# Patient Record
Sex: FEMALE
[De-identification: names, ages, dates, MRNs, and addresses within clinical notes are randomized per-mention and may not be internally consistent; named-entity substitution may affect disease eponyms.]

---

## 2022-11-07 PROBLEM — Z00.00 ENCOUNTER FOR PREVENTIVE HEALTH EXAMINATION: Status: ACTIVE | Noted: 2022-11-07

## 2022-11-19 ENCOUNTER — TRANSCRIPTION ENCOUNTER (OUTPATIENT)
Age: 37
End: 2022-11-19

## 2022-11-19 ENCOUNTER — INPATIENT (INPATIENT)
Facility: HOSPITAL | Age: 37
LOS: 0 days | Discharge: HOME | End: 2022-11-19
Attending: OBSTETRICS & GYNECOLOGY | Admitting: OBSTETRICS & GYNECOLOGY
Payer: COMMERCIAL

## 2022-11-19 VITALS — SYSTOLIC BLOOD PRESSURE: 137 MMHG | HEART RATE: 76 BPM | DIASTOLIC BLOOD PRESSURE: 82 MMHG

## 2022-11-19 VITALS — OXYGEN SATURATION: 99 % | HEART RATE: 133 BPM

## 2022-11-19 DIAGNOSIS — Z3A.00 WEEKS OF GESTATION OF PREGNANCY NOT SPECIFIED: ICD-10-CM

## 2022-11-19 DIAGNOSIS — Z98.890 OTHER SPECIFIED POSTPROCEDURAL STATES: Chronic | ICD-10-CM

## 2022-11-19 DIAGNOSIS — Z98.891 HISTORY OF UTERINE SCAR FROM PREVIOUS SURGERY: Chronic | ICD-10-CM

## 2022-11-19 DIAGNOSIS — Z34.80 ENCOUNTER FOR SUPERVISION OF OTHER NORMAL PREGNANCY, UNSPECIFIED TRIMESTER: ICD-10-CM

## 2022-11-19 DIAGNOSIS — O26.899 OTHER SPECIFIED PREGNANCY RELATED CONDITIONS, UNSPECIFIED TRIMESTER: ICD-10-CM

## 2022-11-19 LAB
ALBUMIN SERPL ELPH-MCNC: 3.6 G/DL — SIGNIFICANT CHANGE UP (ref 3.3–5)
ALP SERPL-CCNC: 79 U/L — SIGNIFICANT CHANGE UP (ref 40–120)
ALT FLD-CCNC: 15 U/L — SIGNIFICANT CHANGE UP (ref 10–45)
ANION GAP SERPL CALC-SCNC: 12 MMOL/L — SIGNIFICANT CHANGE UP (ref 5–17)
AST SERPL-CCNC: 14 U/L — SIGNIFICANT CHANGE UP (ref 10–40)
BILIRUB SERPL-MCNC: 0.2 MG/DL — SIGNIFICANT CHANGE UP (ref 0.2–1.2)
BLD GP AB SCN SERPL QL: NEGATIVE — SIGNIFICANT CHANGE UP
BUN SERPL-MCNC: 6 MG/DL — LOW (ref 7–23)
CALCIUM SERPL-MCNC: 9.4 MG/DL — SIGNIFICANT CHANGE UP (ref 8.4–10.5)
CHLORIDE SERPL-SCNC: 101 MMOL/L — SIGNIFICANT CHANGE UP (ref 96–108)
CO2 SERPL-SCNC: 22 MMOL/L — SIGNIFICANT CHANGE UP (ref 22–31)
CREAT SERPL-MCNC: 0.47 MG/DL — LOW (ref 0.5–1.3)
EGFR: 126 ML/MIN/1.73M2 — SIGNIFICANT CHANGE UP
GLUCOSE SERPL-MCNC: 109 MG/DL — HIGH (ref 70–99)
HCT VFR BLD CALC: 38.2 % — SIGNIFICANT CHANGE UP (ref 34.5–45)
HGB BLD-MCNC: 12.5 G/DL — SIGNIFICANT CHANGE UP (ref 11.5–15.5)
MCHC RBC-ENTMCNC: 28.4 PG — SIGNIFICANT CHANGE UP (ref 27–34)
MCHC RBC-ENTMCNC: 32.7 GM/DL — SIGNIFICANT CHANGE UP (ref 32–36)
MCV RBC AUTO: 86.8 FL — SIGNIFICANT CHANGE UP (ref 80–100)
NRBC # BLD: 0 /100 WBCS — SIGNIFICANT CHANGE UP (ref 0–0)
PLATELET # BLD AUTO: 295 K/UL — SIGNIFICANT CHANGE UP (ref 150–400)
POTASSIUM SERPL-MCNC: 3.5 MMOL/L — SIGNIFICANT CHANGE UP (ref 3.5–5.3)
POTASSIUM SERPL-SCNC: 3.5 MMOL/L — SIGNIFICANT CHANGE UP (ref 3.5–5.3)
PROT SERPL-MCNC: 6.7 G/DL — SIGNIFICANT CHANGE UP (ref 6–8.3)
RBC # BLD: 4.4 M/UL — SIGNIFICANT CHANGE UP (ref 3.8–5.2)
RBC # FLD: 14.3 % — SIGNIFICANT CHANGE UP (ref 10.3–14.5)
RH IG SCN BLD-IMP: POSITIVE — SIGNIFICANT CHANGE UP
RH IG SCN BLD-IMP: POSITIVE — SIGNIFICANT CHANGE UP
SARS-COV-2 RNA SPEC QL NAA+PROBE: SIGNIFICANT CHANGE UP
SODIUM SERPL-SCNC: 135 MMOL/L — SIGNIFICANT CHANGE UP (ref 135–145)
WBC # BLD: 18.43 K/UL — HIGH (ref 3.8–10.5)
WBC # FLD AUTO: 18.43 K/UL — HIGH (ref 3.8–10.5)

## 2022-11-19 PROCEDURE — 86900 BLOOD TYPING SEROLOGIC ABO: CPT

## 2022-11-19 PROCEDURE — 87075 CULTR BACTERIA EXCEPT BLOOD: CPT

## 2022-11-19 PROCEDURE — G0463: CPT

## 2022-11-19 PROCEDURE — 85027 COMPLETE CBC AUTOMATED: CPT

## 2022-11-19 PROCEDURE — 86850 RBC ANTIBODY SCREEN: CPT

## 2022-11-19 PROCEDURE — 88305 TISSUE EXAM BY PATHOLOGIST: CPT

## 2022-11-19 PROCEDURE — 59409 OBSTETRICAL CARE: CPT | Mod: U7

## 2022-11-19 PROCEDURE — 88305 TISSUE EXAM BY PATHOLOGIST: CPT | Mod: 26

## 2022-11-19 PROCEDURE — 87186 SC STD MICRODIL/AGAR DIL: CPT

## 2022-11-19 PROCEDURE — 87077 CULTURE AEROBIC IDENTIFY: CPT

## 2022-11-19 PROCEDURE — 86901 BLOOD TYPING SEROLOGIC RH(D): CPT

## 2022-11-19 PROCEDURE — 80053 COMPREHEN METABOLIC PANEL: CPT

## 2022-11-19 PROCEDURE — 87635 SARS-COV-2 COVID-19 AMP PRB: CPT

## 2022-11-19 PROCEDURE — 36415 COLL VENOUS BLD VENIPUNCTURE: CPT

## 2022-11-19 PROCEDURE — 87070 CULTURE OTHR SPECIMN AEROBIC: CPT

## 2022-11-19 RX ORDER — DIBUCAINE 1 %
1 OINTMENT (GRAM) RECTAL EVERY 6 HOURS
Refills: 0 | Status: DISCONTINUED | OUTPATIENT
Start: 2022-11-19 | End: 2022-11-19

## 2022-11-19 RX ORDER — KETOROLAC TROMETHAMINE 30 MG/ML
30 SYRINGE (ML) INJECTION ONCE
Refills: 0 | Status: DISCONTINUED | OUTPATIENT
Start: 2022-11-19 | End: 2022-11-19

## 2022-11-19 RX ORDER — OXYCODONE HYDROCHLORIDE 5 MG/1
5 TABLET ORAL ONCE
Refills: 0 | Status: DISCONTINUED | OUTPATIENT
Start: 2022-11-19 | End: 2022-11-19

## 2022-11-19 RX ORDER — SODIUM CHLORIDE 9 MG/ML
1000 INJECTION, SOLUTION INTRAVENOUS
Refills: 0 | Status: DISCONTINUED | OUTPATIENT
Start: 2022-11-19 | End: 2022-11-19

## 2022-11-19 RX ORDER — HYDROMORPHONE HYDROCHLORIDE 2 MG/ML
0.5 INJECTION INTRAMUSCULAR; INTRAVENOUS; SUBCUTANEOUS ONCE
Refills: 0 | Status: DISCONTINUED | OUTPATIENT
Start: 2022-11-19 | End: 2022-11-19

## 2022-11-19 RX ORDER — MAGNESIUM HYDROXIDE 400 MG/1
30 TABLET, CHEWABLE ORAL
Refills: 0 | Status: DISCONTINUED | OUTPATIENT
Start: 2022-11-19 | End: 2022-11-19

## 2022-11-19 RX ORDER — CARBOPROST TROMETHAMINE 250 UG/ML
250 INJECTION, SOLUTION INTRAMUSCULAR
Refills: 0 | Status: DISCONTINUED | OUTPATIENT
Start: 2022-11-19 | End: 2022-11-19

## 2022-11-19 RX ORDER — ACETAMINOPHEN 500 MG
1000 TABLET ORAL ONCE
Refills: 0 | Status: COMPLETED | OUTPATIENT
Start: 2022-11-19 | End: 2022-11-19

## 2022-11-19 RX ORDER — LANOLIN
1 OINTMENT (GRAM) TOPICAL EVERY 6 HOURS
Refills: 0 | Status: DISCONTINUED | OUTPATIENT
Start: 2022-11-19 | End: 2022-11-19

## 2022-11-19 RX ORDER — IBUPROFEN 200 MG
600 TABLET ORAL EVERY 6 HOURS
Refills: 0 | Status: COMPLETED | OUTPATIENT
Start: 2022-11-19 | End: 2023-10-18

## 2022-11-19 RX ORDER — ALPRAZOLAM 0.25 MG
0.5 TABLET ORAL ONCE
Refills: 0 | Status: DISCONTINUED | OUTPATIENT
Start: 2022-11-19 | End: 2022-11-19

## 2022-11-19 RX ORDER — INFLUENZA VIRUS VACCINE 15; 15; 15; 15 UG/.5ML; UG/.5ML; UG/.5ML; UG/.5ML
0.5 SUSPENSION INTRAMUSCULAR ONCE
Refills: 0 | Status: DISCONTINUED | OUTPATIENT
Start: 2022-11-19 | End: 2022-11-19

## 2022-11-19 RX ORDER — IBUPROFEN 200 MG
600 TABLET ORAL EVERY 6 HOURS
Refills: 0 | Status: DISCONTINUED | OUTPATIENT
Start: 2022-11-19 | End: 2022-11-19

## 2022-11-19 RX ORDER — DIPHENOXYLATE HCL/ATROPINE 2.5-.025MG
2 TABLET ORAL ONCE
Refills: 0 | Status: DISCONTINUED | OUTPATIENT
Start: 2022-11-19 | End: 2022-11-19

## 2022-11-19 RX ORDER — PRAMOXINE HYDROCHLORIDE 150 MG/15G
1 AEROSOL, FOAM RECTAL EVERY 4 HOURS
Refills: 0 | Status: DISCONTINUED | OUTPATIENT
Start: 2022-11-19 | End: 2022-11-19

## 2022-11-19 RX ORDER — SODIUM CHLORIDE 9 MG/ML
3 INJECTION INTRAMUSCULAR; INTRAVENOUS; SUBCUTANEOUS EVERY 8 HOURS
Refills: 0 | Status: DISCONTINUED | OUTPATIENT
Start: 2022-11-19 | End: 2022-11-19

## 2022-11-19 RX ORDER — ACETAMINOPHEN 500 MG
975 TABLET ORAL
Refills: 0 | Status: DISCONTINUED | OUTPATIENT
Start: 2022-11-19 | End: 2022-11-19

## 2022-11-19 RX ORDER — AER TRAVELER 0.5 G/1
1 SOLUTION RECTAL; TOPICAL EVERY 4 HOURS
Refills: 0 | Status: DISCONTINUED | OUTPATIENT
Start: 2022-11-19 | End: 2022-11-19

## 2022-11-19 RX ORDER — BENZOCAINE 10 %
1 GEL (GRAM) MUCOUS MEMBRANE EVERY 6 HOURS
Refills: 0 | Status: DISCONTINUED | OUTPATIENT
Start: 2022-11-19 | End: 2022-11-19

## 2022-11-19 RX ORDER — HYDROMORPHONE HYDROCHLORIDE 2 MG/ML
0.4 INJECTION INTRAMUSCULAR; INTRAVENOUS; SUBCUTANEOUS ONCE
Refills: 0 | Status: DISCONTINUED | OUTPATIENT
Start: 2022-11-19 | End: 2022-11-19

## 2022-11-19 RX ORDER — OXYCODONE HYDROCHLORIDE 5 MG/1
5 TABLET ORAL
Refills: 0 | Status: DISCONTINUED | OUTPATIENT
Start: 2022-11-19 | End: 2022-11-19

## 2022-11-19 RX ORDER — SIMETHICONE 80 MG/1
80 TABLET, CHEWABLE ORAL EVERY 4 HOURS
Refills: 0 | Status: DISCONTINUED | OUTPATIENT
Start: 2022-11-19 | End: 2022-11-19

## 2022-11-19 RX ORDER — HYDROCORTISONE 1 %
1 OINTMENT (GRAM) TOPICAL EVERY 6 HOURS
Refills: 0 | Status: DISCONTINUED | OUTPATIENT
Start: 2022-11-19 | End: 2022-11-19

## 2022-11-19 RX ADMIN — HYDROMORPHONE HYDROCHLORIDE 0.5 MILLIGRAM(S): 2 INJECTION INTRAMUSCULAR; INTRAVENOUS; SUBCUTANEOUS at 06:16

## 2022-11-19 RX ADMIN — Medication 2 TABLET(S): at 04:14

## 2022-11-19 RX ADMIN — HYDROMORPHONE HYDROCHLORIDE 0.5 MILLIGRAM(S): 2 INJECTION INTRAMUSCULAR; INTRAVENOUS; SUBCUTANEOUS at 03:52

## 2022-11-19 RX ADMIN — HYDROMORPHONE HYDROCHLORIDE 0.5 MILLIGRAM(S): 2 INJECTION INTRAMUSCULAR; INTRAVENOUS; SUBCUTANEOUS at 07:05

## 2022-11-19 RX ADMIN — Medication 0.5 MILLIGRAM(S): at 03:55

## 2022-11-19 RX ADMIN — CARBOPROST TROMETHAMINE 250 MICROGRAM(S): 250 INJECTION, SOLUTION INTRAMUSCULAR at 04:18

## 2022-11-19 RX ADMIN — HYDROMORPHONE HYDROCHLORIDE 0.5 MILLIGRAM(S): 2 INJECTION INTRAMUSCULAR; INTRAVENOUS; SUBCUTANEOUS at 04:20

## 2022-11-19 RX ADMIN — Medication 400 MILLIGRAM(S): at 03:03

## 2022-11-19 RX ADMIN — Medication 600 MILLIGRAM(S): at 10:50

## 2022-11-19 NOTE — DISCHARGE NOTE OB - WAS YOUR LAST COVID-19 VACCINE GREATER THAN OR EQUAL TO TWO MONTHS AGO?
Family updated via RONNIE Hoover (volunteer). TRANSFER - OUT REPORT:    Verbal report given to Aman Chinchilla on [address]  being transferred to  for routine post - op       Report consisted of patients Situation, Background, Assessment and   Recommendations(SBAR). Information from the following report(s) SBAR and MAR was reviewed with the receiving nurse. Lines:   Peripheral IV 08/17/17 Right Hand (Active)   Site Assessment Clean, dry, & intact 8/17/2017 12:48 PM   Phlebitis Assessment 0 8/17/2017 12:48 PM   Infiltration Assessment 0 8/17/2017 12:48 PM   Dressing Status Clean, dry, & intact 8/17/2017 12:48 PM   Dressing Type Tape;Transparent 8/17/2017 12:48 PM   Hub Color/Line Status Pink; Infusing 8/17/2017 12:48 PM   Alcohol Cap Used No 8/17/2017  9:14 AM        Opportunity for questions and clarification was provided.       Patient transported with:   O2 @ 2 liters No Yes

## 2022-11-19 NOTE — DISCHARGE NOTE OB - PATIENT PORTAL LINK FT
You can access the FollowMyHealth Patient Portal offered by [facility] by registering at the following website: [email or web address]. By joining [employer]’s FollowMyHealth portal, you will also be able to view your health information using other applications (apps) compatible with our system.

## 2022-11-19 NOTE — OB RN PATIENT PROFILE - NS_OBGYNHISTORY_OBGYN_ALL_OB_FT
Misc x1, TOP x2,   C/section x1 (2007)-Eclampsia with Seizure & coma for 6 days Blind for 1 month took BP med for 6+ weeks after discharge  Br Augmentation & Tummy Tuck   Anxiety on Lexapro 5mg   R Ovarian cyst Misc x1, TOP x2,  C/section x1 (2007)-Eclampsia with Seizure & coma for 6 days Blind for 1 month took BP med for 6+ weeks after discharge  R Ovarian cyst

## 2022-11-19 NOTE — DISCHARGE NOTE OB - NS MD DC FALL RISK RISK
For information on Fall & Injury Prevention, visit: [email or web address] OR  [email or web address] OR  https://www.cdc.gov/steadi/patient.html

## 2022-11-19 NOTE — OB RN PATIENT PROFILE - NSICDXPASTSURGICALHX_GEN_ALL_CORE_FT
PAST SURGICAL HISTORY:  H/O abdominoplasty     H/O  section     History of abdominoplasty     History of augmentation of both breasts     Two previous induced terminations of pregnancy

## 2022-11-19 NOTE — DISCHARGE NOTE OB - PLAN OF CARE
The patient met all appropriate post partum milestones.  The patient was able to void, tolerated a regular diet, and ambulated on her own.  The patient was discharged on PPD#0 afebrile, with stable vital signs, and appropriate pain control.

## 2022-11-19 NOTE — DISCHARGE NOTE OB - ADDITIONAL INSTRUCTIONS
After discharge, please stay on pelvic rest for 6 weeks, meaning no sexual intercourse, no tampons and no douching.  No driving for 2 weeks as women can loose a lot of blood during delivery and there is a possibility of being lightheaded/fainting.  No lifting objects heavier than baby for two weeks.  Expect to have vaginal bleeding/spotting for up to six weeks.  The bleeding should get lighter and more white/light brown with time.  For bleeding soaking more than a pad an hour or passing clots greater than the size of your fist, come in to the emergency department.    Follow up in clinic in 2 weeks.

## 2022-11-19 NOTE — DISCHARGE NOTE OB - HOSPITAL COURSE
37 y.o.  who presented @16w3d with abdominal pain and vaginal bleeding. PNC with Dr. Jim Colin at [facility]. On exam, she ws noted to have significant pain and on ultrasound membranes funneled down to cervical canal, consistent with an inevitable AB. Patient received one dose of 800mcg buccal cytotec and had subsequent , . After one dose of Hemabate the placenta delivered. She was able to void, ambulate, pain well managed, tolerated po on day of discharge. Vital signs were stable, she was instructed to follow up closely for postpartum visit.

## 2022-11-19 NOTE — OB RN PATIENT PROFILE - FUNCTIONAL ASSESSMENT - BASIC MOBILITY 6.
4-calculated by average/Not able to assess (calculate score using [facility] averaging method)

## 2022-11-19 NOTE — DISCHARGE NOTE OB - PROVIDER TOKENS
Fall
FREE:[LAST:[[facility]],PHONE:[phone]],FAX:[(   )    -],ADDRESS:[[address] [place] [address] [place] [address]],FOLLOWUP:[2 weeks]]

## 2022-11-19 NOTE — OB RN DELIVERY SUMMARY - NSSELHIDDEN_OBGYN_ALL_OB_FT
[NS_DeliveryAttending1_OBGYN_ALL_OB_FT:[id number]],[NS_DeliveryRN_OBGYN_ALL_OB_FT:[id number]],[NS_CirculateRN2_OBGYN_ALL_OB_FT:[id number]]

## 2022-11-19 NOTE — DISCHARGE NOTE OB - CARE PROVIDER_API CALL
[facility],   [address] [place] [address] [address]   [place] [address]  Phone: [phone]  Fax: (   )    -  Follow Up Time: 2 weeks

## 2022-11-19 NOTE — OB PROVIDER DELIVERY SUMMARY - NSPROVIDERDELIVERYNOTE_OBGYN_ALL_OB_FT
Buccal cytotec 800mcg given to patient. Soon after, called to room by pts nurse stating pt feeling pressure. Upon arrival to pts room, SROM bloody fluid noted. VE revealed fetus in vaginal canal. Pt pushed once & fetus delivered. Cord clamped & cut, fetus handed to nurses. Placenta left in situ. Pt given dose of hemabate & lomotil. EBL:300+100. Will continue to monitor until placenta delivered.     --------ADDENDUM---------  -Placenta delivered spontaneously after 1 dose of hemabate.    [place], PGY3 Buccal cytotec 800mcg given to patient. Soon after, called to room by pts nurse stating pt feeling pressure. Upon arrival to pts room, SROM bloody fluid noted. VE revealed fetus in vaginal canal. Pt pushed once & fetus delivered. Cord clamped & cut, fetus handed to nurses. Placenta left in situ. Pt given dose of hemabate & lomotil. EBL:300+100. Will continue to monitor until placenta delivered.     --------ADDENDUM---------  Placenta delivered spontaneously after 1 dose of hemabate. Placenta in tact, TAUS with thin endometrial stripe.     [place]iota, PGY3

## 2022-11-19 NOTE — DISCHARGE NOTE OB - CARE PLAN
1 Principal Discharge DX:	Vaginal delivery  Assessment and plan of treatment:	The patient met all appropriate post partum milestones.  The patient was able to void, tolerated a regular diet, and ambulated on her own.  The patient was discharged on PPD#0 afebrile, with stable vital signs, and appropriate pain control.

## 2022-11-19 NOTE — OB PROVIDER H&P - HISTORY OF PRESENT ILLNESS
38yo  @16.3wks GA presents with c/o significant abdominal pain & vaginal spotting. Pt states the pain began at 5pm & has been sharp & constant since. Pt presents now as pain is more severe. Pt also has noted pink spotting on the toilet paper when she wipes. Denies fevers, chills, N/V, CP, SOB, dysuria, constipation. +FM. -LOF. -CTXs. -VB. Pt denies any other concerns.    – PNC: Denies prenatal issues. GBS _.  EFW _g.  – OBHx:   – GynHx: denies h/o fibroids, STIs, abnormal cervical exams  – PMH: denies  – PSH: denies  – Psych: denies h/o anxiety/depression  – Social: denies h/o toxic habits in pregnancy  – Meds: PNV   – Allergies: NKDA  – Will accept blood transfusions? Yes 38yo  @16.3wks GA presents with c/o significant abdominal pain & vaginal spotting. Pt states the pain began at 5pm & has been sharp & constant since. Pt presents now as pain is more severe. Pt also has noted pink spotting on the toilet paper when she wipes. Denies fevers, chills, N/V, CP, SOB, dysuria, constipation. Denies leakage of fluid.    – PNC: Denies prenatal issues. Care with Dr. Jim Colin, [facility].  – OBHx: FT pLTCS - eclampsia, was in a coma & blind for 1 month per patient, TOPx2 s/p D&Cx2, MABx1   – GynHx: right ovarian cyst, denies h/o fibroids, STIs, abnormal cervical exams  – PMH: Eclampsia  – PSH: C/Sx1, Abdominoplasty x1, breast augmentation x1  – Psych: h/o anxiety  – Social: denies h/o toxic habits in pregnancy  – Meds: PNV, lexapro 5mg   – Allergies: NKDA  – Will accept blood transfusions? Yes

## 2022-11-19 NOTE — OB PROVIDER H&P - ASSESSMENT
38yo  @16.3wks GA presents with c/o significant abdominal pain & vaginal spotting. Pt having active miscarriage.    -stat labs sent  -IV pain medication PRN  -fetal demise paperwork to be completed  -IV fluids   -expectant management at this time, will consider buccal cytotec to augment process    [place]iota, PGY3  Seen w/ Dr. Low & Dr. James 36yo  @16.3wks GA presents with c/o significant abdominal pain & vaginal spotting. Pt having active miscarriage.    -stat labs sent  -IV pain medication PRN  -fetal demise paperwork to be completed  -IV fluids   -expectant management at this time, will consider buccal cytotec to augment process    [place], PGY3  Seen w/ Dr. Low & Dr. James  __________________________________________________________________________________________________________________________________________    36yo  @16.3wk presenting during a second trimester AB. Will assist in active mng. Emotional support. To see LIEN/chaplain Keith Low MD

## 2022-11-19 NOTE — OB PROVIDER H&P - NSHPPHYSICALEXAM_GEN_ALL_CORE
Objective  T(C): 36.6 (11-19-22 @ 04:27), Max: 36.7 (11-19-22 @ 01:43)  HR: 80 (11-19-22 @ 05:08) (69 - 113)  BP: 125/77 (11-19-22 @ 05:00) (120/67 - 137/82)  RR: 18 (11-19-22 @ 04:27) (18 - 19)  SpO2: 98% (11-19-22 @ 05:08) (81% - 100%)    PE:   CV: RRR  Pulm: breathing comfortably on RA  Abd: gravid, tender to palpation lower abdomen  Extr: moving all extremities with ease  VE: tender to palpation of cervix, cervix posterior, difficult to assess dilation  FHT: 160  Sono: +FM, +FHR, membranes funneling down into cervical canal, unable to assess length of cervix 2/2 funneling, appears closed

## 2022-11-22 ENCOUNTER — INPATIENT (INPATIENT)
Facility: HOSPITAL | Age: 37
LOS: 1 days | Discharge: HOME | DRG: 779 | End: 2022-11-24
Attending: OBSTETRICS & GYNECOLOGY | Admitting: OBSTETRICS & GYNECOLOGY
Payer: COMMERCIAL

## 2022-11-22 VITALS
HEIGHT: 65 IN | TEMPERATURE: 103 F | RESPIRATION RATE: 20 BRPM | SYSTOLIC BLOOD PRESSURE: 129 MMHG | DIASTOLIC BLOOD PRESSURE: 82 MMHG | OXYGEN SATURATION: 95 % | HEART RATE: 115 BPM | WEIGHT: 199.96 LBS

## 2022-11-22 DIAGNOSIS — Z98.890 OTHER SPECIFIED POSTPROCEDURAL STATES: Chronic | ICD-10-CM

## 2022-11-22 DIAGNOSIS — O03.4 INCOMPLETE SPONTANEOUS ABORTION WITHOUT COMPLICATION: ICD-10-CM

## 2022-11-22 DIAGNOSIS — Z98.891 HISTORY OF UTERINE SCAR FROM PREVIOUS SURGERY: Chronic | ICD-10-CM

## 2022-11-22 LAB
ALBUMIN SERPL ELPH-MCNC: 3.5 G/DL — SIGNIFICANT CHANGE UP (ref 3.3–5)
ALP SERPL-CCNC: 88 U/L — SIGNIFICANT CHANGE UP (ref 40–120)
ALT FLD-CCNC: 31 U/L — SIGNIFICANT CHANGE UP (ref 10–45)
ANION GAP SERPL CALC-SCNC: 10 MMOL/L — SIGNIFICANT CHANGE UP (ref 5–17)
APPEARANCE UR: CLEAR — SIGNIFICANT CHANGE UP
AST SERPL-CCNC: 28 U/L — SIGNIFICANT CHANGE UP (ref 10–40)
BACTERIA # UR AUTO: NEGATIVE — SIGNIFICANT CHANGE UP
BASE EXCESS BLDV CALC-SCNC: -1.2 MMOL/L — SIGNIFICANT CHANGE UP (ref -2–3)
BASOPHILS # BLD AUTO: 0.02 K/UL — SIGNIFICANT CHANGE UP (ref 0–0.2)
BASOPHILS NFR BLD AUTO: 0.4 % — SIGNIFICANT CHANGE UP (ref 0–2)
BILIRUB SERPL-MCNC: 0.1 MG/DL — LOW (ref 0.2–1.2)
BILIRUB UR-MCNC: NEGATIVE — SIGNIFICANT CHANGE UP
BUN SERPL-MCNC: 9 MG/DL — SIGNIFICANT CHANGE UP (ref 7–23)
CA-I SERPL-SCNC: 1.26 MMOL/L — SIGNIFICANT CHANGE UP (ref 1.15–1.33)
CALCIUM SERPL-MCNC: 9.2 MG/DL — SIGNIFICANT CHANGE UP (ref 8.4–10.5)
CHLORIDE BLDV-SCNC: 105 MMOL/L — SIGNIFICANT CHANGE UP (ref 96–108)
CHLORIDE SERPL-SCNC: 105 MMOL/L — SIGNIFICANT CHANGE UP (ref 96–108)
CO2 BLDV-SCNC: 26 MMOL/L — SIGNIFICANT CHANGE UP (ref 22–26)
CO2 SERPL-SCNC: 24 MMOL/L — SIGNIFICANT CHANGE UP (ref 22–31)
COLOR SPEC: YELLOW — SIGNIFICANT CHANGE UP
CREAT SERPL-MCNC: 0.59 MG/DL — SIGNIFICANT CHANGE UP (ref 0.5–1.3)
DIFF PNL FLD: ABNORMAL
EGFR: 119 ML/MIN/1.73M2 — SIGNIFICANT CHANGE UP
EOSINOPHIL # BLD AUTO: 0.2 K/UL — SIGNIFICANT CHANGE UP (ref 0–0.5)
EOSINOPHIL NFR BLD AUTO: 3.9 % — SIGNIFICANT CHANGE UP (ref 0–6)
EPI CELLS # UR: 2 /HPF — SIGNIFICANT CHANGE UP
FLUAV H3 RNA SPEC QL NAA+PROBE: DETECTED
GAS PNL BLDV: 136 MMOL/L — SIGNIFICANT CHANGE UP (ref 136–145)
GAS PNL BLDV: SIGNIFICANT CHANGE UP
GAS PNL BLDV: SIGNIFICANT CHANGE UP
GLUCOSE BLDV-MCNC: 83 MG/DL — SIGNIFICANT CHANGE UP (ref 70–99)
GLUCOSE SERPL-MCNC: 85 MG/DL — SIGNIFICANT CHANGE UP (ref 70–99)
GLUCOSE UR QL: NEGATIVE — SIGNIFICANT CHANGE UP
HCG SERPL-ACNC: 496.7 MIU/ML — HIGH
HCO3 BLDV-SCNC: 24 MMOL/L — SIGNIFICANT CHANGE UP (ref 22–29)
HCT VFR BLD CALC: 32.8 % — LOW (ref 34.5–45)
HCT VFR BLDA CALC: 33 % — LOW (ref 34.5–46.5)
HGB BLD CALC-MCNC: 11.1 G/DL — LOW (ref 11.7–16.1)
HGB BLD-MCNC: 10.7 G/DL — LOW (ref 11.5–15.5)
HYALINE CASTS # UR AUTO: 1 /LPF — SIGNIFICANT CHANGE UP (ref 0–2)
IMM GRANULOCYTES NFR BLD AUTO: 2 % — HIGH (ref 0–0.9)
KETONES UR-MCNC: NEGATIVE — SIGNIFICANT CHANGE UP
LACTATE BLDV-MCNC: 1.1 MMOL/L — SIGNIFICANT CHANGE UP (ref 0.5–2)
LEUKOCYTE ESTERASE UR-ACNC: ABNORMAL
LYMPHOCYTES # BLD AUTO: 0.8 K/UL — LOW (ref 1–3.3)
LYMPHOCYTES # BLD AUTO: 15.7 % — SIGNIFICANT CHANGE UP (ref 13–44)
MCHC RBC-ENTMCNC: 28 PG — SIGNIFICANT CHANGE UP (ref 27–34)
MCHC RBC-ENTMCNC: 32.6 GM/DL — SIGNIFICANT CHANGE UP (ref 32–36)
MCV RBC AUTO: 85.9 FL — SIGNIFICANT CHANGE UP (ref 80–100)
MONOCYTES # BLD AUTO: 0.6 K/UL — SIGNIFICANT CHANGE UP (ref 0–0.9)
MONOCYTES NFR BLD AUTO: 11.8 % — SIGNIFICANT CHANGE UP (ref 2–14)
NEUTROPHILS # BLD AUTO: 3.37 K/UL — SIGNIFICANT CHANGE UP (ref 1.8–7.4)
NEUTROPHILS NFR BLD AUTO: 66.2 % — SIGNIFICANT CHANGE UP (ref 43–77)
NITRITE UR-MCNC: NEGATIVE — SIGNIFICANT CHANGE UP
NRBC # BLD: 0 /100 WBCS — SIGNIFICANT CHANGE UP (ref 0–0)
PCO2 BLDV: 43 MMHG — HIGH (ref 39–42)
PH BLDV: 7.36 — SIGNIFICANT CHANGE UP (ref 7.32–7.43)
PH UR: 6 — SIGNIFICANT CHANGE UP (ref 5–8)
PLATELET # BLD AUTO: 289 K/UL — SIGNIFICANT CHANGE UP (ref 150–400)
PO2 BLDV: 39 MMHG — SIGNIFICANT CHANGE UP (ref 25–45)
POTASSIUM BLDV-SCNC: 3.6 MMOL/L — SIGNIFICANT CHANGE UP (ref 3.5–5.1)
POTASSIUM SERPL-MCNC: 4.2 MMOL/L — SIGNIFICANT CHANGE UP (ref 3.5–5.3)
POTASSIUM SERPL-SCNC: 4.2 MMOL/L — SIGNIFICANT CHANGE UP (ref 3.5–5.3)
PROT SERPL-MCNC: 6.4 G/DL — SIGNIFICANT CHANGE UP (ref 6–8.3)
PROT UR-MCNC: SIGNIFICANT CHANGE UP
RAPID RVP RESULT: DETECTED
RBC # BLD: 3.82 M/UL — SIGNIFICANT CHANGE UP (ref 3.8–5.2)
RBC # FLD: 14.4 % — SIGNIFICANT CHANGE UP (ref 10.3–14.5)
RBC CASTS # UR COMP ASSIST: 46 /HPF — HIGH (ref 0–4)
SAO2 % BLDV: 68.6 % — SIGNIFICANT CHANGE UP (ref 67–88)
SARS-COV-2 RNA SPEC QL NAA+PROBE: SIGNIFICANT CHANGE UP
SARS-COV-2 RNA SPEC QL NAA+PROBE: SIGNIFICANT CHANGE UP
SODIUM SERPL-SCNC: 139 MMOL/L — SIGNIFICANT CHANGE UP (ref 135–145)
SP GR SPEC: 1.03 — HIGH (ref 1.01–1.02)
UROBILINOGEN FLD QL: NEGATIVE — SIGNIFICANT CHANGE UP
WBC # BLD: 5.09 K/UL — SIGNIFICANT CHANGE UP (ref 3.8–10.5)
WBC # FLD AUTO: 5.09 K/UL — SIGNIFICANT CHANGE UP (ref 3.8–10.5)
WBC UR QL: 31 /HPF — HIGH (ref 0–5)

## 2022-11-22 PROCEDURE — 93975 VASCULAR STUDY: CPT | Mod: 26

## 2022-11-22 PROCEDURE — 99285 EMERGENCY DEPT VISIT HI MDM: CPT

## 2022-11-22 PROCEDURE — 71045 X-RAY EXAM CHEST 1 VIEW: CPT | Mod: 26

## 2022-11-22 PROCEDURE — 99221 1ST HOSP IP/OBS SF/LOW 40: CPT

## 2022-11-22 PROCEDURE — 76830 TRANSVAGINAL US NON-OB: CPT | Mod: 26

## 2022-11-22 RX ORDER — OXYCODONE HYDROCHLORIDE 5 MG/1
5 TABLET ORAL EVERY 6 HOURS
Refills: 0 | Status: DISCONTINUED | OUTPATIENT
Start: 2022-11-22 | End: 2022-11-23

## 2022-11-22 RX ORDER — ACETAMINOPHEN 500 MG
975 TABLET ORAL EVERY 6 HOURS
Refills: 0 | Status: DISCONTINUED | OUTPATIENT
Start: 2022-11-22 | End: 2022-11-24

## 2022-11-22 RX ORDER — GENTAMICIN SULFATE 40 MG/ML
450 VIAL (ML) INJECTION DAILY
Refills: 0 | Status: DISCONTINUED | OUTPATIENT
Start: 2022-11-22 | End: 2022-11-24

## 2022-11-22 RX ORDER — AMPICILLIN SODIUM AND SULBACTAM SODIUM 250; 125 MG/ML; MG/ML
3 INJECTION, POWDER, FOR SUSPENSION INTRAMUSCULAR; INTRAVENOUS ONCE
Refills: 0 | Status: COMPLETED | OUTPATIENT
Start: 2022-11-22 | End: 2022-11-22

## 2022-11-22 RX ORDER — SODIUM CHLORIDE 9 MG/ML
1000 INJECTION INTRAMUSCULAR; INTRAVENOUS; SUBCUTANEOUS ONCE
Refills: 0 | Status: COMPLETED | OUTPATIENT
Start: 2022-11-22 | End: 2022-11-22

## 2022-11-22 RX ORDER — AMPICILLIN TRIHYDRATE 250 MG
2 CAPSULE ORAL EVERY 6 HOURS
Refills: 0 | Status: DISCONTINUED | OUTPATIENT
Start: 2022-11-22 | End: 2022-11-23

## 2022-11-22 RX ORDER — IBUPROFEN 200 MG
400 TABLET ORAL EVERY 6 HOURS
Refills: 0 | Status: DISCONTINUED | OUTPATIENT
Start: 2022-11-22 | End: 2022-11-24

## 2022-11-22 RX ADMIN — SODIUM CHLORIDE 1000 MILLILITER(S): 9 INJECTION INTRAMUSCULAR; INTRAVENOUS; SUBCUTANEOUS at 15:21

## 2022-11-22 RX ADMIN — AMPICILLIN SODIUM AND SULBACTAM SODIUM 200 GRAM(S): 250; 125 INJECTION, POWDER, FOR SUSPENSION INTRAMUSCULAR; INTRAVENOUS at 15:21

## 2022-11-22 RX ADMIN — Medication 300 MILLIGRAM(S): at 22:13

## 2022-11-22 RX ADMIN — Medication 400 MILLIGRAM(S): at 19:27

## 2022-11-22 NOTE — ED ADULT TRIAGE NOTE - CHIEF COMPLAINT QUOTE
Had a miscarriage on 11/19; returns today with fever, abdominal pain, cough, vaginal bleeding. Changing pads every 2 hours.

## 2022-11-22 NOTE — CONSULT NOTE ADULT - ATTENDING COMMENTS
P1  s/p missed ab 3 days ago presents w URI w fever and generalized abd pain  and vaginal pain w cough  no dysuria  min-mod vaginal bleeding    WBC wnl  febrile  Vitals as above    Pelvic sono w endo stripe 1.2 cm w some fluid (common in immediate pp)    VE: no CMT but tender  Abd:     While symptoms likely URI related (pressure w cough) in light of tenderness will recommend CDU w cytotec to avoid instrumenting uterus in light of prior C/S.  Discussed that vs D&C.     Reviewed findings w gyn team and on coming team. WIll discuss plan w pt desire.     RO Saravia MD   8am-6PM

## 2022-11-22 NOTE — H&P ADULT - NSHPLABSRESULTS_GEN_ALL_CORE
LABS:  Blood type: O Positive                          10.7<L>   5.09 >-----------< 289    ( 11-22 @ 15:29 )             32.8<L>    11-22-22 @ 15:29      139  |  105  |  9   ----------------------------<  85  4.2   |  24  |  0.59        Ca    9.2      22 Nov 2022 15:29    TPro  6.4  /  Alb  3.5  /  TBili  0.1<L>  /  DBili  x   /  AST  28  /  ALT  31  /  AlkPhos  88  11-22-22 @ 15:29    Urinalysis (11.22.22 @ 15:30)   pH Urine: 6.0   Glucose Qualitative, Urine: Negative   Blood, Urine: Large   Color: Yellow   Urine Appearance: Clear   Bilirubin: Negative   Ketone - Urine: Negative   Specific Gravity: 1.027   Protein, Urine: Trace   Urobilinogen: Negative   Nitrite: Negative   Leukocyte Esterase Concentration: Moderate     Urine Microscopic-Add On (NC) (11.22.22 @ 15:30)   Bacteria: Negative   Epithelial Cells: 2 /hpf   Red Blood Cell - Urine: 46 /hpf   White Blood Cell - Urine: 31 /HPF   Hyaline Casts: 1 /lpf     HCG Quantitative, Serum (11.22.22 @ 15:29)   HCG Quantitative, Serum: 496.7          RADIOLOGY STUDIES:  < from: US Transvaginal (11.22.22 @ 16:41) >    ACC: [id number] EXAM:  US DPLX PELVIC                        ACC: [id number] EXAM:  US TRANSVAGINAL                          PROCEDURE DATE:  11/22/2022          INTERPRETATION:  CLINICAL INFORMATION: Miscarriage 11/19/2022, assess for   retained products of conception.    LMP: Unknown.    COMPARISON: None available.    TECHNIQUE:  Endovaginal and transabdominal pelvic sonogram. Color and Spectral   Doppler was performed.    FINDINGS:  Uterus: 12.6 x 6.2 x 10.6 cm. Within normal limits.  Endometrium: 1.2 cm. Echogenic contents within the endometrial canal   which demonstrate vascularity. The peak systolic velocity is up to 37   cm/s suggesting retained products.  Cervical nabothian cysts.    Right ovary: 3.5 x 2.2 x 2.2 cm inclusive of a follicle measuring 1.1 cm.  Left ovary: 2.4 x 1.3 x 2.2 cm. Within normal limits.    Fluid: None.    IMPRESSION:    Echogenic material within the endometrium with foci of increased   vascularity suggesting retained products of conception.        --- End of Report ---           WESTON FAN MD; Resident Radiology  This document has been electronically signed.  DURGA PORTER MD; Attending Radiologist  This document has been electronically signed. Nov 22 2022  5:11PM    < end of copied text >

## 2022-11-22 NOTE — ED ADULT NURSE NOTE - OBJECTIVE STATEMENT
36 y/o F PMHx eclampsia c/c fever since Saturday after having a miscarriage. Pt reported that she started having contraction on 11/18 and her water broke and gave birth on Saturday here at the [facility]. Pt stated that she started having fever/chills/non productive cough acc/ with pain/ lower abdominal cramping. Denies CP/SOB/n/v/d/dizziness. Placed on cardiac monitor, VSS. Safety precautions maintained.

## 2022-11-22 NOTE — CONSULT NOTE ADULT - SUBJECTIVE AND OBJECTIVE BOX
*INCOMPLETE NOTE*  HPI:     38yo  @16.3wks GA presents with c/o significant abdominal pain & vaginal spotting. Pt states the pain began at 5pm & has been sharp & constant since. Pt presents now as pain is more severe. Pt also has noted pink spotting on the toilet paper when she wipes. Denies fevers, chills, N/V, CP, SOB, dysuria, constipation. Denies leakage of fluid.    – PNC: Denies prenatal issues. Care with Dr. Jim Colin, [facility].  – OBHx: FT pLTCS - eclampsia, was in a coma & blind for 1 month per patient, TOPx2 s/p D&Cx2, MABx1, Spontaneous  at   – GynHx: right ovarian cyst, denies h/o fibroids, STIs, abnormal cervical exams  – PMH: Eclampsia  – PSH: C/Sx1, Abdominoplasty x1, breast augmentation x1  – Psych: h/o anxiety  – Social: denies h/o toxic habits in pregnancy  – Meds: PNV, lexapro 5mg   – Allergies: NKDA  – Will accept blood transfusions? Yes      MEDS:  ampicillin/sulbactam  IVPB 3 Gram(s) IV Intermittent Once  sodium chloride 0.9% Bolus 1000 milliLiter(s) IV Bolus once      Vital Signs Last 24 Hrs  T(C): 39.2 (2022 13:46), Max: 39.2 (2022 13:46)  T(F): 102.5 (2022 13:46), Max: 102.5 (2022 13:46)  HR: 115 (2022 13:46) (115 - 115)  BP: 129/82 (2022 13:46) (129/82 - 129/82)  RR: 20 (2022 13:46) (20 - 20)  SpO2: 95% (2022 13:46) (95% - 95%)    Parameters below as of 2022 13:46  Patient On (Oxygen Delivery Method): room air      PHYSICAL EXAM:  CHEST/LUNG: Clear to percussion bilaterally; No rales, rhonchi, wheezing, or rubs  HEART: Regular rate and rhythm; No murmurs, rubs, or gallops  ABDOMEN: Soft, Nontender, Nondistended; Bowel sounds present  EXTREMITIES:  2+ Peripheral Pulses, No clubbing, cyanosis, or edema  PELVIC:        EXTERNAL GENITALIA: Normal. No rashes or lesions noted.         VAGINA: healthy pink mucosa, no gross lesions, no discharge noted, no blood noted.          CERVIX: no lesions. closed, no active bleeding through os. no CMt noted.        UTERUS: normal size, nontender, mobile        ADNEXA: no adnexal masses or tenderness appreciated.    LABS:  pending              RADIOLOGY STUDIES:  pending HPI:     38yo  now 3days s/p  after presenting to L&D with PPPROM @16.3wks GA. Patient currently presents with fever, nonproductive cough, presents with c/o fever, chills, nonproductive cough, and lower abdominal pain and cramping. Denies CP/SOB/n/v/d/dizziness.     GYN consulted for concern for endometritis s/p SAB @16weeks GA. Patient was discharged from the hospital on the day of her  with only mild abdominal pain and tenderness, though no fever, chills or cough were present at the time of discharge.     – PNC: Denies prenatal issues. Care with Dr. Jim Colin, [facility].  – OBHx: FT pLTCS - eclampsia, was in a coma & blind for 1 month per patient, TOPx2 s/p D&Cx2, MABx1, Spontaneous  at [id number] after presenting with PPPROM on 2022  – GynHx: right ovarian cyst, denies h/o fibroids, STIs, abnormal cervical exams  – PMHx: Eclampsia  – PSH: C/Sx1, Abdominoplasty x1, breast augmentation x1  – Psych: h/o anxiety  – Social: denies h/o toxic habits in pregnancy  – Meds: PNV, Lexapro 5mg   – Allergies: NKDA  – Will accept blood transfusions? Yes      MEDS:  ampicillin/sulbactam  IVPB 3 Gram(s) IV Intermittent Once  sodium chloride 0.9% Bolus 1000 milliLiter(s) IV Bolus once      Vital Signs Last 24 Hrs  T(C): 39.2 (2022 13:46), Max: 39.2 (2022 13:46)  T(F): 102.5 (2022 13:46), Max: 102.5 (2022 13:46)  HR: 115 (2022 13:46) (115 - 115)  BP: 129/82 (2022 13:46) (129/82 - 129/82)  RR: 20 (2022 13:46) (20 - 20)  SpO2: 95% (2022 13:46) (95% - 95%)    Parameters below as of 2022 13:46  Patient On (Oxygen Delivery Method): room air      PHYSICAL EXAM:  CHEST/LUNG: Clear to percussion bilaterally; No rales, rhonchi, wheezing, or rubs  HEART: Regular rate and rhythm; No murmurs, rubs, or gallops  ABDOMEN: Soft, Nontender, Nondistended; Bowel sounds present  EXTREMITIES:  2+ Peripheral Pulses, No clubbing, cyanosis, or edema  PELVIC: *incomplete as exam has not been performed yet*        EXTERNAL GENITALIA: Normal. No rashes or lesions noted.         VAGINA: healthy pink mucosa, no gross lesions, no discharge noted, no blood noted.          CERVIX: no lesions. closed, no active bleeding through os. no CMt noted.        UTERUS: normal size, nontender, mobile        ADNEXA: no adnexal masses or tenderness appreciated.    LABS:  Blood type: O Positive                          10.7<L>   5.09 >-----------< 289    (  @ 15:29 )             32.8<L>    22 @ 15:29      139  |  105  |  9   ----------------------------<  85  4.2   |  24  |  0.59        Ca    9.2      2022 15:29    TPro  6.4  /  Alb  3.5  /  TBili  0.1<L>  /  DBili  x   /  AST  28  /  ALT  31  /  AlkPhos  88  22 @ 15:29    Urinalysis (22 @ 15:30)   pH Urine: 6.0   Glucose Qualitative, Urine: Negative   Blood, Urine: Large   Color: Yellow   Urine Appearance: Clear   Bilirubin: Negative   Ketone - Urine: Negative   Specific Gravity: 1.027   Protein, Urine: Trace   Urobilinogen: Negative   Nitrite: Negative   Leukocyte Esterase Concentration: Moderate     Urine Microscopic-Add On (NC) (22 @ 15:30)   Bacteria: Negative   Epithelial Cells: 2 /hpf   Red Blood Cell - Urine: 46 /hpf   White Blood Cell - Urine: 31 /HPF   Hyaline Casts: 1 /lpf     HCG Quantitative, Serum (22 @ 15:29)   HCG Quantitative, Serum: 496.7          RADIOLOGY STUDIES:  -pending HPI:     36yo  now 3days s/p  after presenting to L&D with PPPROM @16.3wks GA. Patient currently presents with fever, nonproductive cough, presents with c/o fever, chills, nonproductive cough, and lower abdominal pain and cramping. Patient states that she is also experiencing vaginal bleeding, though has only saturated one pad over the course of the day.     GYN consulted for concern for endometritis s/p SAB @16weeks GA. Patient was discharged from the hospital on the day of her  with only mild abdominal pain and tenderness, though no fever, chills or cough were present at the time of discharge. Patient states that when she went home on the evening of , she began experiencing fevers and chills and soon developed a dry cough shortly after, the patient states that she has had subjective fevers off and on over the course of the last 3 days as well as a headache and lightheadedness and dizziness. Denies CP/SOB/n/v/d.     – PNC: Denies prenatal issues. Care with Dr. Jim Colin, [facility].  – OBHx: FT pLTCS - eclampsia, was in a coma & blind for 1 month per patient, TOPx2 s/p D&Cx2, MABx1, Spontaneous  at [id number] after presenting with PPPROM on 2022  – GynHx: right ovarian cyst, denies h/o fibroids, STIs, abnormal cervical exams  – PMHx: Eclampsia  – PSH: C/Sx1, Abdominoplasty x1, breast augmentation x1  – Psych: h/o anxiety  – Social: denies h/o toxic habits in pregnancy  – Meds: PNV, Lexapro 5mg   – Allergies: NKDA  – Will accept blood transfusions? Yes      MEDS:  ampicillin/sulbactam  IVPB 3 Gram(s) IV Intermittent Once  sodium chloride 0.9% Bolus 1000 milliLiter(s) IV Bolus once      Vital Signs Last 24 Hrs  T(C): 39.2 (2022 13:46), Max: 39.2 (2022 13:46)  T(F): 102.5 (2022 13:46), Max: 102.5 (2022 13:46)  HR: 115 (2022 13:46) (115 - 115)  BP: 129/82 (2022 13:46) (129/82 - 129/82)  RR: 20 (2022 13:46) (20 - 20)  SpO2: 95% (2022 13:46) (95% - 95%)    Parameters below as of 2022 13:46  Patient On (Oxygen Delivery Method): room air      PHYSICAL EXAM:  CHEST/LUNG: Clear to percussion bilaterally; No rales, rhonchi, wheezing, or rubs  HEART: Regular rate and rhythm; No murmurs, rubs, or gallops  ABDOMEN: Soft, Nontender, Nondistended; Bowel sounds present  EXTREMITIES:  2+ Peripheral Pulses, No clubbing, cyanosis, or edema  PELVIC: *incomplete as exam has not been performed yet*        EXTERNAL GENITALIA: Normal. No rashes or lesions noted.         VAGINA: healthy pink mucosa, no gross lesions, no discharge noted, minimal blood in vaginal vault         CERVIX: no lesions. closed, no active bleeding through os. no CMT noted.        UTERUS: 10 week size, diffusely tender, mobile        ADNEXA: no adnexal masses or tenderness appreciated.    LABS:  Blood type: O Positive                          10.7<L>   5.09 >-----------< 289    (  @ 15:29 )             32.8<L>    22 @ 15:29      139  |  105  |  9   ----------------------------<  85  4.2   |  24  |  0.59        Ca    9.2      2022 15:29    TPro  6.4  /  Alb  3.5  /  TBili  0.1<L>  /  DBili  x   /  AST  28  /  ALT  31  /  AlkPhos  88  22 @ 15:29    Urinalysis (22 @ 15:30)   pH Urine: 6.0   Glucose Qualitative, Urine: Negative   Blood, Urine: Large   Color: Yellow   Urine Appearance: Clear   Bilirubin: Negative   Ketone - Urine: Negative   Specific Gravity: 1.027   Protein, Urine: Trace   Urobilinogen: Negative   Nitrite: Negative   Leukocyte Esterase Concentration: Moderate     Urine Microscopic-Add On (NC) (22 @ 15:30)   Bacteria: Negative   Epithelial Cells: 2 /hpf   Red Blood Cell - Urine: 46 /hpf   White Blood Cell - Urine: 31 /HPF   Hyaline Casts: 1 /lpf     HCG Quantitative, Serum (22 @ 15:29)   HCG Quantitative, Serum: 496.7          RADIOLOGY STUDIES:  < from: US Transvaginal (22 @ 16:41) >    ACC: [id number] EXAM:  US DPLX PELVIC                        ACC: [id number] EXAM:  US TRANSVAGINAL                          PROCEDURE DATE:  2022          INTERPRETATION:  CLINICAL INFORMATION: Miscarriage 2022, assess for   retained products of conception.    LMP: Unknown.    COMPARISON: None available.    TECHNIQUE:  Endovaginal and transabdominal pelvic sonogram. Color and Spectral   Doppler was performed.    FINDINGS:  Uterus: 12.6 x 6.2 x 10.6 cm. Within normal limits.  Endometrium: 1.2 cm. Echogenic contents within the endometrial canal   which demonstrate vascularity. The peak systolic velocity is up to 37   cm/s suggesting retained products.  Cervical nabothian cysts.    Right ovary: 3.5 x 2.2 x 2.2 cm inclusive of a follicle measuring 1.1 cm.  Left ovary: 2.4 x 1.3 x 2.2 cm. Within normal limits.    Fluid: None.    IMPRESSION:    Echogenic material within the endometrium with foci of increased   vascularity suggesting retained products of conception.        --- End of Report ---           WESTON FAN MD; Resident Radiology  This document has been electronically signed.  DURGA PORTER MD; Attending Radiologist  This document has been electronically signed. 2022  5:11PM    < end of copied text >

## 2022-11-22 NOTE — H&P ADULT - HISTORY OF PRESENT ILLNESS
HPI:     36yo  now 3days s/p  after presenting to L&D with PPPROM @16.3wks GA. Patient currently presents with fever, nonproductive cough, presents with c/o fever, chills, nonproductive cough, and lower abdominal pain and cramping. Patient states that she is also experiencing vaginal bleeding, though has only saturated one pad over the course of the day.     GYN consulted for concern for endometritis s/p SAB @16weeks GA. Patient was discharged from the hospital on the day of her  with only mild abdominal pain and tenderness, though no fever, chills or cough were present at the time of discharge. Patient states that when she went home on the evening of , she began experiencing fevers and chills and soon developed a dry cough shortly after, the patient states that she has had subjective fevers off and on over the course of the last 3 days as well as a headache and lightheadedness and dizziness. Denies CP/SOB/n/v/d.     – PNC: Denies prenatal issues. Care with Dr. Jim Colin, [facility].  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 Yes

## 2022-11-22 NOTE — ED PROVIDER NOTE - PROGRESS NOTE DETAILS
Brian PGY2  OBPADMININ consulted. Brian PGY2   Admit to OB for further management of retained products

## 2022-11-22 NOTE — ED ADULT NURSE NOTE - NS ED PATIENT SAFETY CONCERN
[No Acute Distress] : no acute distress [Well Nourished] : well nourished [Well Developed] : well developed [Well-Appearing] : well-appearing [Normal Sclera/Conjunctiva] : normal sclera/conjunctiva [PERRL] : pupils equal round and reactive to light No [EOMI] : extraocular movements intact [Normal Outer Ear/Nose] : the outer ears and nose were normal in appearance [Normal Oropharynx] : the oropharynx was normal [Normal TMs] : both tympanic membranes were normal [No JVD] : no jugular venous distention [No Lymphadenopathy] : no lymphadenopathy [Supple] : supple [Thyroid Normal, No Nodules] : the thyroid was normal and there were no nodules present [No Respiratory Distress] : no respiratory distress  [No Accessory Muscle Use] : no accessory muscle use [Clear to Auscultation] : lungs were clear to auscultation bilaterally [Normal Rate] : normal rate  [Regular Rhythm] : with a regular rhythm [Normal S1, S2] : normal S1 and S2 [No Carotid Bruits] : no carotid bruits [No Abdominal Bruit] : a ~M bruit was not heard ~T in the abdomen [Pedal Pulses Present] : the pedal pulses are present [No Palpable Aorta] : no palpable aorta [No Extremity Clubbing/Cyanosis] : no extremity clubbing/cyanosis [Soft] : abdomen soft [Non Tender] : non-tender [Non-distended] : non-distended [No Masses] : no abdominal mass palpated [No HSM] : no HSM [Normal Bowel Sounds] : normal bowel sounds [Normal Posterior Cervical Nodes] : no posterior cervical lymphadenopathy [Normal Anterior Cervical Nodes] : no anterior cervical lymphadenopathy [No CVA Tenderness] : no CVA  tenderness [No Spinal Tenderness] : no spinal tenderness [No Joint Swelling] : no joint swelling [Grossly Normal Strength/Tone] : grossly normal strength/tone [No Rash] : no rash [Coordination Grossly Intact] : coordination grossly intact [No Focal Deficits] : no focal deficits [Normal Gait] : normal gait [Deep Tendon Reflexes (DTR)] : deep tendon reflexes were 2+ and symmetric [Speech Grossly Normal] : speech grossly normal [Normal Affect] : the affect was normal [Alert and Oriented x3] : oriented to person, place, and time [Normal Mood] : the mood was normal [Normal Insight/Judgement] : insight and judgment were intact [de-identified] : with murmur  [de-identified] : wearing teds stocking   Left  leg 4 plus edema  [de-identified] : obese  [de-identified] : advised the need to see GYN

## 2022-11-22 NOTE — ED PROVIDER NOTE - CLINICAL SUMMARY MEDICAL DECISION MAKING FREE TEXT BOX
Patient is a 37 year-old-female, , with history of recent miscarriage @16.3wks presents with fever and lower abdominal pain. Concerning for endometritis vs. retained products. Will obtain sepsis workup and TVUS. Fluids and unasyn. OBGYN consult.

## 2022-11-22 NOTE — ED PROVIDER NOTE - OBJECTIVE STATEMENT
Patient is a 37 year-old-female Patient is a 37 year-old-female, , with history of recent miscarriage @16.3wks presents with fever and lower abdominal pain. Patient states that she was in the hospital 3 days ago for miscarriage. Went to  home this morning and then started to feel unwell, feeling warm and chills. Also +lower abdominal pain. Has been having vaginal bleeding in the last few days, not worsening, having been replacing pads every 2 hours but is usually lightly stained. Patient is a 37 year-old-female, , with history of recent miscarriage @16.3wks presents with fever and lower abdominal pain. Patient states that she was in the hospital 3 days ago for miscarriage. Went to  home this morning and then started to feel unwell, feeling warm and chills. Also +lower abdominal pain. Has been having vaginal bleeding in the last few days, not worsening, having been replacing pads every 2 hours but is usually lightly stained.    Attendinyo female presents with fever since leaving hospital 2 days ago after miscarriage.  did not have surgery or D&C.  has pelvic pain and vaginal bleeding.

## 2022-11-22 NOTE — CONSULT NOTE ADULT - ASSESSMENT
A/P: 38yo  now 3days s/p  after presenting to L&D with PPPROM @16.3wks GA no presenting with fever, chills and presently tachycardia. WBC wnl.     -Pending exam, imaging and presentation to attending.    Jennifer Campos, PGY2  A/P: 38yo  now 3days s/p  after presenting to L&D with PPPROM @16.3wks GA no presenting with fever, chills and presently tachycardia. WBC wnl. TVUS significant for: Endometrium: 1.2 cm. Echogenic contents within the endometrial canal which demonstrate vascularity. The peak systolic velocity is up to 37 cm/s suggesting retained products. Cervical nabothian cysts. Patient is diffusely tender and concern for endometritis with retained POCs vs URI. Covid PCR pending. Patient now afebrile and normotensive, with HR wnl.     -to be seen with attending.    Jennifer Campos, PGY2

## 2022-11-22 NOTE — ED PROVIDER NOTE - PHYSICAL EXAMINATION
General: appears tearful, warm to the touch   HEENT: atraumatic, normocephalic; pupils are equal, round and react to light, extraocular movements intact bilaterally without deficits, no conjunctival pallor, mucous membranes moist  Neck: no jugular venous distension, full range of motion  Chest/Lung: clear to auscultation bilaterally, no wheezes/rhonchi/rales  Heart: tachycardic  Abdomen: tenderness noted over lower abdomen, old  scar appreciated in the lower abdomen   Extremities: no lower extremity edema, +2 radial pulses bilaterally, +2 dorsalis pedis pulses bilaterally  Musculoskeletal: full range of motion of all 4 extremities  Nervous System: alert and oriented, no motor deficits or sensory deficits; CNII-XII grossly intact; no focal neurologic deficits  Skin: no rashes/lacerations noted

## 2022-11-22 NOTE — H&P ADULT - ASSESSMENT
A/P: 36yo  now 3days s/p  after presenting to L&D with PPPROM @16.3wks GA no presenting with fever, chills and presently tachycardia. WBC wnl. HC.7. TVUS significant for: Endometrium: 1.2 cm. Echogenic contents within the endometrial canal which demonstrate vascularity. The peak systolic velocity is up to 37 cm/s suggesting retained products of conception. Patient is diffusely tender and there is concern for endometritis with retained POCs vs URI. Covid PCR and RVP pending. Patient now afebrile and normotensive, with HR wnl. Risks vs benefits of instrumentation vs medical management of retained POCs were explained to the patient and shared decision making was performed. Patient elected to treat retained POCs medically with 800 mcg of cytotec bucally administered x2 doses. Patient also to receive antibiotic management with Ampicillin and Gentamicin; s/p Unasyn in ED. Patient admitted for inpatient management of endometritis with retained products.      Neuro:   - Pain well controlled with current regimen. Tylenol, Motrin and Oxycodone PRN.    Cardiac:   - No active issues  - BP well controlled     Pulmonary:   - r/o viral URI; f/u COVID PCR and RVP  -CXR (): shows clear lungs    GI:   - Tolerating regular diet     :   - Voiding spontaneously  -monitor I&Os  -monitor pad counts  - Admission creatinine 0.59 -> f/u AM BMP    Skin: No active disease.     ID: Suspected cause of fever is URI vs endometritis  - Admission WBC 5.09 -> f/u AM CBC  -UA significant for mod leuk esterase  - s/p Unasyn x1 dose in ED ()  -start Ampicillin 2g IV [id number] (-) and Gentamicin 5mg/kg IV [id number](-)    Heme:   - Preop Hgb 10.7 -> f/u AM CBC  - SCDs when not ambulating       Endo: No active disease        FEN:   - Electrolytes wnl -> F/u AM BMP    Psych: no active problems    Dispo: inpatient management of endometritis    Patient seen and examined with Dr. Manjit Campos, PGY2       A/P: 36yo  now 3 days s/p  after presenting to L&D with PPPROM @16.3wks GA no presenting with fever, chills and presently tachycardia. No lekocytosis. HC.7. TVUS significant for endometrium 1.2 cm. Patient febrile on presentation which is possibly 2/2 to endometritis in the setting of fundal tenderness vs URI in the setting of respiratory symptoms.  Covid PCR and RVP pending. Patient now afebrile and normotensive, with HR wnl. Differential diagnosis at this time includes postpartum endometritis vs. viral URI vs. lower concern for retained POC's/septic  in the setting of relatively thin endometrial stripe and lack of leukocytosis.  Risks vs benefits of instrumentation vs medical management were explained to the patient. Will admit for inpatient medical management. Patient to treated with Ampicillin and Gentamicin; s/p Unasyn in ED. Also to to receive cytotec prophylactically.     Neuro:   - Pain well controlled with current regimen. Tylenol, Motrin and Oxycodone PRN.    Cardiac:   - No active issues  - BP well controlled     Pulmonary:   - r/o viral URI; f/u COVID PCR and RVP  -CXR (): shows clear lungs    GI:   - Tolerating regular diet     :   - Voiding spontaneously  - monitor I&Os  - monitor pad counts  - Admission creatinine 0.59 -> f/u AM BMP    Skin: No active disease.     ID: Suspected cause of fever is URI vs endometritis   - Admission WBC 5.09 -> f/u AM CBC  - UA significant for mod leuk esterase  - s/p Unasyn x1 dose in ED ()  - start Ampicillin 2g IV [id number] (-) and Gentamicin 5mg/kg IV [id number](-)  - Cytotec 800mcg buccal x2 doses prophylactically     Heme:   - Preop Hgb 10.7 -> f/u AM CBC  - SCDs when not ambulating    Endo: No active disease      FEN:   - Electrolytes wnl -> F/u AM BMP    Psych: no active problems    Dispo: inpatient management of endometritis    Patient seen and examined with Dr. Manjit Campos, PGY2

## 2022-11-22 NOTE — H&P ADULT - NSHPPHYSICALEXAM_GEN_ALL_CORE
Vital Signs Last 24 Hrs  T(C): 39.2 (22 Nov 2022 13:46), Max: 39.2 (22 Nov 2022 13:46)  T(F): 102.5 (22 Nov 2022 13:46), Max: 102.5 (22 Nov 2022 13:46)  HR: 115 (22 Nov 2022 13:46) (115 - 115)  BP: 129/82 (22 Nov 2022 13:46) (129/82 - 129/82)  RR: 20 (22 Nov 2022 13:46) (20 - 20)  SpO2: 95% (22 Nov 2022 13:46) (95% - 95%)    Parameters below as of 22 Nov 2022 13:46  Patient On (Oxygen Delivery Method): room air      PHYSICAL EXAM:  CHEST/LUNG: Clear to percussion bilaterally; No rales, rhonchi, wheezing, or rubs  HEART: Regular rate and rhythm; No murmurs, rubs, or gallops  ABDOMEN: Soft, Nontender, Nondistended; Bowel sounds present  EXTREMITIES:  2+ Peripheral Pulses, No clubbing, cyanosis, or edema  PELVIC:         EXTERNAL GENITALIA: Normal. No rashes or lesions noted.         VAGINA: healthy pink mucosa, no gross lesions, no discharge noted, minimal blood in vaginal vault         CERVIX: no lesions. 1cm dilated, no active bleeding through os. tender to palpation at cervical os out of proportion to normal tenderness on exam.         UTERUS: 10 week size, diffusely tender        ADNEXA: no adnexal masses or tenderness appreciated.

## 2022-11-23 ENCOUNTER — TRANSCRIPTION ENCOUNTER (OUTPATIENT)
Age: 37
End: 2022-11-23

## 2022-11-23 LAB
ANION GAP SERPL CALC-SCNC: 14 MMOL/L — SIGNIFICANT CHANGE UP (ref 5–17)
BASOPHILS # BLD AUTO: 0.02 K/UL — SIGNIFICANT CHANGE UP (ref 0–0.2)
BASOPHILS NFR BLD AUTO: 0.4 % — SIGNIFICANT CHANGE UP (ref 0–2)
BLD GP AB SCN SERPL QL: NEGATIVE — SIGNIFICANT CHANGE UP
BUN SERPL-MCNC: 7 MG/DL — SIGNIFICANT CHANGE UP (ref 7–23)
CALCIUM SERPL-MCNC: 8.8 MG/DL — SIGNIFICANT CHANGE UP (ref 8.4–10.5)
CHLORIDE SERPL-SCNC: 99 MMOL/L — SIGNIFICANT CHANGE UP (ref 96–108)
CO2 SERPL-SCNC: 22 MMOL/L — SIGNIFICANT CHANGE UP (ref 22–31)
CREAT SERPL-MCNC: 0.75 MG/DL — SIGNIFICANT CHANGE UP (ref 0.5–1.3)
EGFR: 105 ML/MIN/1.73M2 — SIGNIFICANT CHANGE UP
EOSINOPHIL # BLD AUTO: 0.14 K/UL — SIGNIFICANT CHANGE UP (ref 0–0.5)
EOSINOPHIL NFR BLD AUTO: 2.5 % — SIGNIFICANT CHANGE UP (ref 0–6)
GLUCOSE SERPL-MCNC: 87 MG/DL — SIGNIFICANT CHANGE UP (ref 70–99)
HCT VFR BLD CALC: 33 % — LOW (ref 34.5–45)
HGB BLD-MCNC: 10.9 G/DL — LOW (ref 11.5–15.5)
IMM GRANULOCYTES NFR BLD AUTO: 1.5 % — HIGH (ref 0–0.9)
LYMPHOCYTES # BLD AUTO: 0.89 K/UL — LOW (ref 1–3.3)
LYMPHOCYTES # BLD AUTO: 16.2 % — SIGNIFICANT CHANGE UP (ref 13–44)
MAGNESIUM SERPL-MCNC: 1.5 MG/DL — LOW (ref 1.6–2.6)
MCHC RBC-ENTMCNC: 28.2 PG — SIGNIFICANT CHANGE UP (ref 27–34)
MCHC RBC-ENTMCNC: 33 GM/DL — SIGNIFICANT CHANGE UP (ref 32–36)
MCV RBC AUTO: 85.3 FL — SIGNIFICANT CHANGE UP (ref 80–100)
MONOCYTES # BLD AUTO: 0.78 K/UL — SIGNIFICANT CHANGE UP (ref 0–0.9)
MONOCYTES NFR BLD AUTO: 14.2 % — HIGH (ref 2–14)
NEUTROPHILS # BLD AUTO: 3.6 K/UL — SIGNIFICANT CHANGE UP (ref 1.8–7.4)
NEUTROPHILS NFR BLD AUTO: 65.2 % — SIGNIFICANT CHANGE UP (ref 43–77)
NRBC # BLD: 0 /100 WBCS — SIGNIFICANT CHANGE UP (ref 0–0)
PHOSPHATE SERPL-MCNC: 3.4 MG/DL — SIGNIFICANT CHANGE UP (ref 2.5–4.5)
PLATELET # BLD AUTO: 302 K/UL — SIGNIFICANT CHANGE UP (ref 150–400)
POTASSIUM SERPL-MCNC: 3.4 MMOL/L — LOW (ref 3.5–5.3)
POTASSIUM SERPL-SCNC: 3.4 MMOL/L — LOW (ref 3.5–5.3)
RBC # BLD: 3.87 M/UL — SIGNIFICANT CHANGE UP (ref 3.8–5.2)
RBC # FLD: 14.3 % — SIGNIFICANT CHANGE UP (ref 10.3–14.5)
RH IG SCN BLD-IMP: POSITIVE — SIGNIFICANT CHANGE UP
SODIUM SERPL-SCNC: 135 MMOL/L — SIGNIFICANT CHANGE UP (ref 135–145)
WBC # BLD: 5.51 K/UL — SIGNIFICANT CHANGE UP (ref 3.8–10.5)
WBC # FLD AUTO: 5.51 K/UL — SIGNIFICANT CHANGE UP (ref 3.8–10.5)

## 2022-11-23 RX ORDER — HEPARIN SODIUM 5000 [USP'U]/ML
5000 INJECTION INTRAVENOUS; SUBCUTANEOUS EVERY 12 HOURS
Refills: 0 | Status: DISCONTINUED | OUTPATIENT
Start: 2022-11-23 | End: 2022-11-24

## 2022-11-23 RX ORDER — SODIUM CHLORIDE 9 MG/ML
1000 INJECTION, SOLUTION INTRAVENOUS
Refills: 0 | Status: DISCONTINUED | OUTPATIENT
Start: 2022-11-23 | End: 2022-11-24

## 2022-11-23 RX ORDER — MAGNESIUM OXIDE 400 MG ORAL TABLET 241.3 MG
400 TABLET ORAL ONCE
Refills: 0 | Status: COMPLETED | OUTPATIENT
Start: 2022-11-23 | End: 2022-11-23

## 2022-11-23 RX ORDER — POTASSIUM CHLORIDE 20 MEQ
40 PACKET (EA) ORAL EVERY 4 HOURS
Refills: 0 | Status: COMPLETED | OUTPATIENT
Start: 2022-11-23 | End: 2022-11-23

## 2022-11-23 RX ADMIN — HEPARIN SODIUM 5000 UNIT(S): 5000 INJECTION INTRAVENOUS; SUBCUTANEOUS at 10:10

## 2022-11-23 RX ADMIN — Medication 200 GRAM(S): at 12:25

## 2022-11-23 RX ADMIN — Medication 975 MILLIGRAM(S): at 04:41

## 2022-11-23 RX ADMIN — Medication 200 GRAM(S): at 00:19

## 2022-11-23 RX ADMIN — Medication 975 MILLIGRAM(S): at 17:52

## 2022-11-23 RX ADMIN — HEPARIN SODIUM 5000 UNIT(S): 5000 INJECTION INTRAVENOUS; SUBCUTANEOUS at 22:18

## 2022-11-23 RX ADMIN — Medication 975 MILLIGRAM(S): at 06:45

## 2022-11-23 RX ADMIN — Medication 100 MILLIGRAM(S): at 13:23

## 2022-11-23 RX ADMIN — Medication 200 GRAM(S): at 05:47

## 2022-11-23 RX ADMIN — Medication 200 MILLIGRAM(S): at 10:11

## 2022-11-23 RX ADMIN — Medication 40 MILLIEQUIVALENT(S): at 13:23

## 2022-11-23 RX ADMIN — Medication 975 MILLIGRAM(S): at 18:41

## 2022-11-23 RX ADMIN — SODIUM CHLORIDE 125 MILLILITER(S): 9 INJECTION, SOLUTION INTRAVENOUS at 19:31

## 2022-11-23 RX ADMIN — Medication 200 MILLIGRAM(S): at 02:03

## 2022-11-23 RX ADMIN — Medication 975 MILLIGRAM(S): at 10:09

## 2022-11-23 RX ADMIN — Medication 100 MILLIGRAM(S): at 21:05

## 2022-11-23 RX ADMIN — Medication 300 MILLIGRAM(S): at 22:18

## 2022-11-23 RX ADMIN — MAGNESIUM OXIDE 400 MG ORAL TABLET 400 MILLIGRAM(S): 241.3 TABLET ORAL at 15:17

## 2022-11-23 RX ADMIN — Medication 200 MILLIGRAM(S): at 17:45

## 2022-11-23 RX ADMIN — Medication 40 MILLIEQUIVALENT(S): at 17:33

## 2022-11-23 RX ADMIN — Medication 75 MILLIGRAM(S): at 17:23

## 2022-11-23 RX ADMIN — Medication 75 MILLIGRAM(S): at 04:41

## 2022-11-23 RX ADMIN — Medication 975 MILLIGRAM(S): at 10:57

## 2022-11-23 NOTE — PROVIDER CONTACT NOTE (OTHER) - ASSESSMENT
pt coughing, afebrile at this time and vital signs are WNL. pt flu result came back positive
pt coughing, and reports having chills. temperature 101 F, , resp 18, pulse ox 95%, /80. pt denies shortness of breath at this time

## 2022-11-23 NOTE — PROGRESS NOTE ADULT - ASSESSMENT
A/P: 37y PPD#4 s/p  at 16 3/7 due to IUFD, admitted with fevers, cough and abdominal pain, diagnosed with Influenza. Patient being treated with Tamiflu as well as Amp/Gent for presumed endometritis. Patient with additional fever overnight however clinically appears well, hemodynamically stable without leukocytosis.     Neuro: Pain well controlled with current regimen. Tylenol, Motrin PRN  Cardiac: Hemodynamically stable  Pulmonary:   - RVP positive for Influenza A  - continue Tamiflu (-)  -CXR (): shows clear lungs  GI: Regular Diet  :  Voiding spontaneously  - s/p Cytotec 800mcg buccal x2 doses prophylactically with minimal bleeding overnight  ID: Tmax 39.2 (), febrile this AM 38.3 (445a)  - continue to trend leukocytosis 5.09 -<  - UA significant for mod leuk esterase, + bacteria, large blood- possibly contaminated; f/u UCx ()  - s/p Unasyn x1 dose in ED (), continue Amp/Gent (-)  Heme:   - Hgb 10.7 -> f/u AM CBC  - HSQ and SCDs for DVT PPx  Endo: No active disease    FEN: Electrolytes wnl -> F/u AM BMP  Dispo: Admitted for Flu+, concern for endometritis requiring IV antibiotics    SAVANNAH Saldaña, PGY-4

## 2022-11-23 NOTE — PROVIDER CONTACT NOTE (OTHER) - SITUATION
readmit for fevers and abdominal pain at home after vaginal delivery on 11/19 for 16.3 wk pprom. FLU +. pt has fever of 101F and a HR of 104
readmit for fevers and abdominal pain at home after vaginal delivery on 11/19 for 16.3 wk pprom

## 2022-11-23 NOTE — DISCHARGE NOTE PROVIDER - HOSPITAL COURSE
36yo  admitted PPD#3 days s/p  after presenting to L&D with PPPROM @16.3wks GA with fever, chills and presently tachycardia. Admitted for presumed endometritis vs. URI given abdominal pain and cough. Patient empirrically treatment with Gent/Clinda (-). RVP resulted as positive for Influenza A and patient was started on Tamiflu (-). TVUS significant for endometrium 1.2 cm with likely blood clots, s/p Cytotec 800mcg x2. On HD#2, patient with continued fevers despite medications above. UCx ______. BCx ______. On HD#3_________________. Patient was discharged on HD#_____ in stable condition.     36yo  admitted PPD#3 days s/p  after presenting to L&D with PPPROM @16.3wks GA with fever, chills and presently tachycardia. Admitted for presumed endometritis vs. URI given abdominal pain and cough. Patient empirrically treatment with Gent/Clinda (-). RVP resulted as positive for Influenza A and patient was started on Tamiflu (-). TVUS significant for endometrium 1.2 cm with likely blood clots, s/p Cytotec 800mcg x2. On HD#2, patient with continued fevers despite medications above. UCx negative. BCx preliminarily negative. On HD#3 patient had no complaints, pain resolved. Patient was discharged on HD#3in stable condition.  She was discharged with Augmentin and Tamiflu.

## 2022-11-23 NOTE — PROGRESS NOTE ADULT - SUBJECTIVE AND OBJECTIVE BOX
Patient seen and examined at bedside. Patient febrile overnight, diagnosed with Influenza on RVP  She reports disturbance with cough/fevers and mild abdominal cramping associated with pain with urination. She is otherwise ambulating, tolerating regular diet, passing flatus.   Denies CP, SOB, N/V.    Vital Signs Last 24 Hours  T(C): 38.3 (11-23-22 @ 04:47), Max: 39.2 (11-22-22 @ 13:46)  HR: 104 (11-23-22 @ 04:47) (85 - 115)  BP: 119/80 (11-23-22 @ 04:47) (113/80 - 140/107)  RR: 18 (11-23-22 @ 04:47) (12 - 20)  SpO2: 95% (11-23-22 @ 04:47) (95% - 100%)    I&O's Summary      Physical Exam:  General: NAD  CV: RR  Lungs: CTA b/l  Abdomen: Soft, nondistended, TTP suprapubic region, +BS  : scant bleeding on pad  Ext: No pain or swelling     Labs:                        10.7   5.09  )-----------( 289      ( 22 Nov 2022 15:29 )             32.8   baso 0.4    eos 3.9    imm gran 2.0    lymph 15.7   mono 11.8   poly 66.2       MEDICATIONS  (STANDING):  ampicillin  IVPB 2 Gram(s) IV Intermittent every 6 hours  gentamicin   IVPB 450 milliGRAM(s) IV Intermittent daily  heparin   Injectable 5000 Unit(s) SubCutaneous every 12 hours  oseltamivir 75 milliGRAM(s) Oral two times a day    MEDICATIONS  (PRN):  acetaminophen     Tablet .. 975 milliGRAM(s) Oral every 6 hours PRN Mild Pain (1 - 3), Moderate Pain (4 - 6)  guaiFENesin Oral Liquid (Sugar-Free) 200 milliGRAM(s) Oral every 6 hours PRN Cough  ibuprofen  Tablet. 400 milliGRAM(s) Oral every 6 hours PRN Moderate Pain (4 - 6)  oxyCODONE    IR 5 milliGRAM(s) Oral every 6 hours PRN Severe Pain (7 - 10)

## 2022-11-23 NOTE — PROVIDER CONTACT NOTE (OTHER) - ACTION/TREATMENT ORDERED:
Dr Sarabia aware of flu result
Dr Sarabia aware  PO Tylenol 975mg given  no fluids at this time  Tamiflu given

## 2022-11-23 NOTE — DISCHARGE NOTE PROVIDER - PROVIDER TOKENS
FREE:[LAST:[[facility]],PHONE:[phone]],FAX:[(   )    -],ADDRESS:[[address] [place] [address]],FOLLOWUP:[1 week]]

## 2022-11-23 NOTE — DISCHARGE NOTE PROVIDER - CARE PROVIDER_API CALL
[facility],   [address] [place] [id number]  Phone: [phone]  Fax: (   )    -  Follow Up Time: 1 week

## 2022-11-24 ENCOUNTER — TRANSCRIPTION ENCOUNTER (OUTPATIENT)
Age: 37
End: 2022-11-24

## 2022-11-24 VITALS
HEART RATE: 73 BPM | SYSTOLIC BLOOD PRESSURE: 106 MMHG | OXYGEN SATURATION: 95 % | DIASTOLIC BLOOD PRESSURE: 71 MMHG | RESPIRATION RATE: 18 BRPM | TEMPERATURE: 98 F

## 2022-11-24 LAB
-  AMPICILLIN/SULBACTAM: SIGNIFICANT CHANGE UP
-  CEFAZOLIN: SIGNIFICANT CHANGE UP
-  CLINDAMYCIN: SIGNIFICANT CHANGE UP
-  ERYTHROMYCIN: SIGNIFICANT CHANGE UP
-  GENTAMICIN: SIGNIFICANT CHANGE UP
-  OXACILLIN: SIGNIFICANT CHANGE UP
-  RIFAMPIN: SIGNIFICANT CHANGE UP
-  TETRACYCLINE: SIGNIFICANT CHANGE UP
-  TRIMETHOPRIM/SULFAMETHOXAZOLE: SIGNIFICANT CHANGE UP
-  VANCOMYCIN: SIGNIFICANT CHANGE UP
ANION GAP SERPL CALC-SCNC: 9 MMOL/L — SIGNIFICANT CHANGE UP (ref 5–17)
BASOPHILS # BLD AUTO: 0.02 K/UL — SIGNIFICANT CHANGE UP (ref 0–0.2)
BASOPHILS NFR BLD AUTO: 0.5 % — SIGNIFICANT CHANGE UP (ref 0–2)
BUN SERPL-MCNC: 6 MG/DL — LOW (ref 7–23)
CALCIUM SERPL-MCNC: 8.9 MG/DL — SIGNIFICANT CHANGE UP (ref 8.4–10.5)
CHLORIDE SERPL-SCNC: 104 MMOL/L — SIGNIFICANT CHANGE UP (ref 96–108)
CO2 SERPL-SCNC: 23 MMOL/L — SIGNIFICANT CHANGE UP (ref 22–31)
CREAT SERPL-MCNC: 0.66 MG/DL — SIGNIFICANT CHANGE UP (ref 0.5–1.3)
CULTURE RESULTS: NO GROWTH — SIGNIFICANT CHANGE UP
CULTURE RESULTS: SIGNIFICANT CHANGE UP
CULTURE RESULTS: SIGNIFICANT CHANGE UP
EGFR: 116 ML/MIN/1.73M2 — SIGNIFICANT CHANGE UP
EOSINOPHIL # BLD AUTO: 0.06 K/UL — SIGNIFICANT CHANGE UP (ref 0–0.5)
EOSINOPHIL NFR BLD AUTO: 1.4 % — SIGNIFICANT CHANGE UP (ref 0–6)
GLUCOSE SERPL-MCNC: 87 MG/DL — SIGNIFICANT CHANGE UP (ref 70–99)
HCT VFR BLD CALC: 33.8 % — LOW (ref 34.5–45)
HGB BLD-MCNC: 11.1 G/DL — LOW (ref 11.5–15.5)
IMM GRANULOCYTES NFR BLD AUTO: 1.7 % — HIGH (ref 0–0.9)
LYMPHOCYTES # BLD AUTO: 1.88 K/UL — SIGNIFICANT CHANGE UP (ref 1–3.3)
LYMPHOCYTES # BLD AUTO: 44.3 % — HIGH (ref 13–44)
MAGNESIUM SERPL-MCNC: 1.7 MG/DL — SIGNIFICANT CHANGE UP (ref 1.6–2.6)
MCHC RBC-ENTMCNC: 28.2 PG — SIGNIFICANT CHANGE UP (ref 27–34)
MCHC RBC-ENTMCNC: 32.8 GM/DL — SIGNIFICANT CHANGE UP (ref 32–36)
MCV RBC AUTO: 86 FL — SIGNIFICANT CHANGE UP (ref 80–100)
METHOD TYPE: SIGNIFICANT CHANGE UP
MONOCYTES # BLD AUTO: 0.5 K/UL — SIGNIFICANT CHANGE UP (ref 0–0.9)
MONOCYTES NFR BLD AUTO: 11.8 % — SIGNIFICANT CHANGE UP (ref 2–14)
NEUTROPHILS # BLD AUTO: 1.71 K/UL — LOW (ref 1.8–7.4)
NEUTROPHILS NFR BLD AUTO: 40.3 % — LOW (ref 43–77)
NRBC # BLD: 0 /100 WBCS — SIGNIFICANT CHANGE UP (ref 0–0)
ORGANISM # SPEC MICROSCOPIC CNT: SIGNIFICANT CHANGE UP
ORGANISM # SPEC MICROSCOPIC CNT: SIGNIFICANT CHANGE UP
PHOSPHATE SERPL-MCNC: 4.1 MG/DL — SIGNIFICANT CHANGE UP (ref 2.5–4.5)
PLATELET # BLD AUTO: 283 K/UL — SIGNIFICANT CHANGE UP (ref 150–400)
POTASSIUM SERPL-MCNC: 4.3 MMOL/L — SIGNIFICANT CHANGE UP (ref 3.5–5.3)
POTASSIUM SERPL-SCNC: 4.3 MMOL/L — SIGNIFICANT CHANGE UP (ref 3.5–5.3)
RBC # BLD: 3.93 M/UL — SIGNIFICANT CHANGE UP (ref 3.8–5.2)
RBC # FLD: 14.3 % — SIGNIFICANT CHANGE UP (ref 10.3–14.5)
SODIUM SERPL-SCNC: 136 MMOL/L — SIGNIFICANT CHANGE UP (ref 135–145)
SPECIMEN SOURCE: SIGNIFICANT CHANGE UP
WBC # BLD: 4.24 K/UL — SIGNIFICANT CHANGE UP (ref 3.8–10.5)
WBC # FLD AUTO: 4.24 K/UL — SIGNIFICANT CHANGE UP (ref 3.8–10.5)

## 2022-11-24 PROCEDURE — 71045 X-RAY EXAM CHEST 1 VIEW: CPT

## 2022-11-24 PROCEDURE — 85025 COMPLETE CBC W/AUTO DIFF WBC: CPT

## 2022-11-24 PROCEDURE — 0225U NFCT DS DNA&RNA 21 SARSCOV2: CPT

## 2022-11-24 PROCEDURE — 86850 RBC ANTIBODY SCREEN: CPT

## 2022-11-24 PROCEDURE — 85014 HEMATOCRIT: CPT

## 2022-11-24 PROCEDURE — 82435 ASSAY OF BLOOD CHLORIDE: CPT

## 2022-11-24 PROCEDURE — 82947 ASSAY GLUCOSE BLOOD QUANT: CPT

## 2022-11-24 PROCEDURE — 76830 TRANSVAGINAL US NON-OB: CPT

## 2022-11-24 PROCEDURE — 87086 URINE CULTURE/COLONY COUNT: CPT

## 2022-11-24 PROCEDURE — 80048 BASIC METABOLIC PNL TOTAL CA: CPT

## 2022-11-24 PROCEDURE — U0005: CPT

## 2022-11-24 PROCEDURE — 83605 ASSAY OF LACTIC ACID: CPT

## 2022-11-24 PROCEDURE — 99285 EMERGENCY DEPT VISIT HI MDM: CPT | Mod: 25

## 2022-11-24 PROCEDURE — 96374 THER/PROPH/DIAG INJ IV PUSH: CPT

## 2022-11-24 PROCEDURE — 83735 ASSAY OF MAGNESIUM: CPT

## 2022-11-24 PROCEDURE — 84702 CHORIONIC GONADOTROPIN TEST: CPT

## 2022-11-24 PROCEDURE — 80053 COMPREHEN METABOLIC PANEL: CPT

## 2022-11-24 PROCEDURE — 82330 ASSAY OF CALCIUM: CPT

## 2022-11-24 PROCEDURE — 84132 ASSAY OF SERUM POTASSIUM: CPT

## 2022-11-24 PROCEDURE — 86901 BLOOD TYPING SEROLOGIC RH(D): CPT

## 2022-11-24 PROCEDURE — 93975 VASCULAR STUDY: CPT

## 2022-11-24 PROCEDURE — 87040 BLOOD CULTURE FOR BACTERIA: CPT

## 2022-11-24 PROCEDURE — 84100 ASSAY OF PHOSPHORUS: CPT

## 2022-11-24 PROCEDURE — 82803 BLOOD GASES ANY COMBINATION: CPT

## 2022-11-24 PROCEDURE — 85018 HEMOGLOBIN: CPT

## 2022-11-24 PROCEDURE — 81001 URINALYSIS AUTO W/SCOPE: CPT

## 2022-11-24 PROCEDURE — U0003: CPT

## 2022-11-24 PROCEDURE — 86900 BLOOD TYPING SEROLOGIC ABO: CPT

## 2022-11-24 PROCEDURE — 84295 ASSAY OF SERUM SODIUM: CPT

## 2022-11-24 RX ORDER — SODIUM CHLORIDE 9 MG/ML
3 INJECTION INTRAMUSCULAR; INTRAVENOUS; SUBCUTANEOUS EVERY 8 HOURS
Refills: 0 | Status: DISCONTINUED | OUTPATIENT
Start: 2022-11-24 | End: 2022-11-24

## 2022-11-24 RX ADMIN — Medication 100 MILLIGRAM(S): at 05:26

## 2022-11-24 RX ADMIN — Medication 200 MILLIGRAM(S): at 04:33

## 2022-11-24 RX ADMIN — HEPARIN SODIUM 5000 UNIT(S): 5000 INJECTION INTRAVENOUS; SUBCUTANEOUS at 10:19

## 2022-11-24 RX ADMIN — Medication 75 MILLIGRAM(S): at 05:26

## 2022-11-24 NOTE — DISCHARGE NOTE NURSING/CASE MANAGEMENT/SOCIAL WORK - NSDCPEFALRISK_GEN_ALL_CORE
For information on Fall & Injury Prevention, visit: [email or web address] OR  [email or web address] OR  https://www.cdc.gov/steadi/patient.html

## 2022-11-24 NOTE — PROGRESS NOTE ADULT - SUBJECTIVE AND OBJECTIVE BOX
Patient seen and examined at bedside. Patient febrile overnight, diagnosed with Influenza on RVP.   She reports cough/fevers and mild abdominal cramping associated with pain with urination. She is otherwise ambulating, tolerating regular diet, passing flatus.   Denies CP, SOB, N/V.      Vital Signs Last 24 Hours  T(C): 36.9 (11-24-22 @ 01:02), Max: 38.8 (11-23-22 @ 18:09)  HR: 79 (11-24-22 @ 01:02) (79 - 105)  BP: 108/76 (11-24-22 @ 01:02) (99/74 - 128/87)  RR: 18 (11-24-22 @ 01:02) (18 - 18)  SpO2: 95% (11-24-22 @ 01:02) (94% - 95%)    Physical Exam:  General: NAD  CV: RR  Lungs: CTA b/l  Abdomen: Soft, nondistended, TTP suprapubic region, +BS  : scant bleeding on pad  Ext: No pain or swelling     Labs:                        10.9   5.51  )-----------( 302      ( 23 Nov 2022 07:15 )             33.0   baso 0.4    eos 2.5    imm gran 1.5    lymph 16.2   mono 14.2   poly 65.2                         10.7   5.09  )-----------( 289      ( 22 Nov 2022 15:29 )             32.8   baso 0.4    eos 3.9    imm gran 2.0    lymph 15.7   mono 11.8   poly 66.2       MEDICATIONS  (STANDING):  clindamycin IVPB 900 milliGRAM(s) IV Intermittent every 8 hours  gentamicin   IVPB 450 milliGRAM(s) IV Intermittent daily  heparin   Injectable 5000 Unit(s) SubCutaneous every 12 hours  lactated ringers. 1000 milliLiter(s) (125 mL/Hr) IV Continuous <Continuous>  oseltamivir 75 milliGRAM(s) Oral two times a day    MEDICATIONS  (PRN):  acetaminophen     Tablet .. 975 milliGRAM(s) Oral every 6 hours PRN Mild Pain (1 - 3), Moderate Pain (4 - 6)  guaiFENesin Oral Liquid (Sugar-Free) 200 milliGRAM(s) Oral every 6 hours PRN Cough  ibuprofen  Tablet. 400 milliGRAM(s) Oral every 6 hours PRN Moderate Pain (4 - 6)   Patient seen and examined at bedside. Patient febrile overnight, diagnosed with Influenza on RVP.   She reports continued cough. Denies feeling fevers/chills overnight. Denies abdominal pain. Denies pain with urination. She is ambulating, tolerating regular diet, passing flatus.   Denies CP, SOB, N/V.      Vital Signs Last 24 Hours  T(C): 36.9 (11-24-22 @ 01:02), Max: 38.8 (11-23-22 @ 18:09)  HR: 79 (11-24-22 @ 01:02) (79 - 105)  BP: 108/76 (11-24-22 @ 01:02) (99/74 - 128/87)  RR: 18 (11-24-22 @ 01:02) (18 - 18)  SpO2: 95% (11-24-22 @ 01:02) (94% - 95%)    Physical Exam:  General: NAD  CV: RR  Lungs: CTA b/l  Abdomen: Soft, nondistended, TTP suprapubic region, +BS  : scant bleeding on pad  Ext: No pain or swelling     Labs:                        10.9   5.51  )-----------( 302      ( 23 Nov 2022 07:15 )             33.0   baso 0.4    eos 2.5    imm gran 1.5    lymph 16.2   mono 14.2   poly 65.2                         10.7   5.09  )-----------( 289      ( 22 Nov 2022 15:29 )             32.8   baso 0.4    eos 3.9    imm gran 2.0    lymph 15.7   mono 11.8   poly 66.2       MEDICATIONS  (STANDING):  clindamycin IVPB 900 milliGRAM(s) IV Intermittent every 8 hours  gentamicin   IVPB 450 milliGRAM(s) IV Intermittent daily  heparin   Injectable 5000 Unit(s) SubCutaneous every 12 hours  lactated ringers. 1000 milliLiter(s) (125 mL/Hr) IV Continuous <Continuous>  oseltamivir 75 milliGRAM(s) Oral two times a day    MEDICATIONS  (PRN):  acetaminophen     Tablet .. 975 milliGRAM(s) Oral every 6 hours PRN Mild Pain (1 - 3), Moderate Pain (4 - 6)  guaiFENesin Oral Liquid (Sugar-Free) 200 milliGRAM(s) Oral every 6 hours PRN Cough  ibuprofen  Tablet. 400 milliGRAM(s) Oral every 6 hours PRN Moderate Pain (4 - 6)

## 2022-11-24 NOTE — PROGRESS NOTE ADULT - ASSESSMENT
A/P: 37y PPD#5 s/p  at 16 3/7 due to IUFD, admitted with fevers, cough and abdominal pain, diagnosed with Influenza. Patient being treated with Tamiflu as well as Amp/Gent for presumed endometritis. Patient with additional fever overnight however clinically appears well, hemodynamically stable without leukocytosis.     Neuro: Pain well controlled with current regimen. Tylenol, Motrin PRN  Cardiac: Hemodynamically stable  Pulmonary:   - RVP positive for Influenza A  - continue Tamiflu (-)  -CXR (): shows clear lungs  GI: Regular Diet  :  Voiding spontaneously  - s/p Cytotec 800mcg buccal x2 doses prophylactically with minimal bleeding overnight  ID: Tmax 39.2 (), febrile this AM 38.8 (1809 on )  - continue to trend WBC 5.09 ->5.51  - UA significant for mod leuk esterase, + bacteria, large blood- possibly contaminated; f/u UCx ()  - s/p Unasyn x1 dose in ED (), continue Amp/Gent (-)  Heme:   - Hgb 10.7 -> 10.9, f/u AM CBC  - HSQ and SCDs for DVT PPx  Endo: No active disease    FEN: Electrolytes wnl, repelete PRN  Dispo: Admitted for Flu+, concern for endometritis requiring IV antibiotics    Gaby Davis, PGY2 A/P: 37y PPD#5 s/p  at 16 3/7 due to IUFD, admitted with fevers, cough and abdominal pain, diagnosed with Influenza. Patient being treated with Tamiflu as well as Amp/Gent for presumed endometritis. Patient with additional fever overnight however clinically appears well, hemodynamically stable without leukocytosis.     Neuro: Pain well controlled with current regimen. Tylenol, Motrin PRN  Cardiac: Hemodynamically stable  Pulmonary:   - RVP positive for Influenza A  - continue Tamiflu (-)  -CXR (): shows clear lungs  GI: Regular Diet  :  Voiding spontaneously  - s/p Cytotec 800mcg buccal x2 doses prophylactically with minimal bleeding overnight  ID: Tmax 39.2 (), febrile overnight 38.8 (1809 on )  - continue to trend WBC 5.09 ->5.51  - UA significant for mod leuk esterase, + bacteria, large blood- possibly contaminated; f/u UCx ()  - s/p Unasyn x1 dose in ED (), continue Amp/Gent (-)  - BCx (): NGTD - p   Heme:   - Hgb 10.7 -> 10.9, f/u AM CBC  - HSQ and SCDs for DVT PPx  Endo: No active disease    FEN: Electrolytes wnl, repelete PRN  Dispo: Admitted for Flu+, concern for endometritis requiring IV antibiotics    Gaby Davis, PGY2

## 2022-11-24 NOTE — DISCHARGE NOTE NURSING/CASE MANAGEMENT/SOCIAL WORK - PATIENT PORTAL LINK FT
You can access the FollowMyHealth Patient Portal offered by [facility] by registering at the following website: [email or web address]. By joining [facility]’s FollowMyHealth portal, you will also be able to view your health information using other applications (apps) compatible with our system.

## 2022-11-27 LAB
CULTURE RESULTS: SIGNIFICANT CHANGE UP
CULTURE RESULTS: SIGNIFICANT CHANGE UP
SPECIMEN SOURCE: SIGNIFICANT CHANGE UP
SPECIMEN SOURCE: SIGNIFICANT CHANGE UP

## 2022-12-09 ENCOUNTER — APPOINTMENT (OUTPATIENT)
Dept: OBGYN | Facility: CLINIC | Age: 37
End: 2022-12-09
Payer: COMMERCIAL

## 2022-12-09 VITALS
DIASTOLIC BLOOD PRESSURE: 84 MMHG | SYSTOLIC BLOOD PRESSURE: 126 MMHG | HEIGHT: 65 IN | BODY MASS INDEX: 33.32 KG/M2 | WEIGHT: 200 LBS

## 2022-12-09 DIAGNOSIS — Z80.9 FAMILY HISTORY OF MALIGNANT NEOPLASM, UNSPECIFIED: ICD-10-CM

## 2022-12-09 DIAGNOSIS — Z80.0 FAMILY HISTORY OF MALIGNANT NEOPLASM OF DIGESTIVE ORGANS: ICD-10-CM

## 2022-12-09 DIAGNOSIS — J11.1 INFLUENZA DUE TO UNIDENTIFIED INFLUENZA VIRUS WITH OTHER RESPIRATORY MANIFESTATIONS: ICD-10-CM

## 2022-12-09 DIAGNOSIS — Z78.9 OTHER SPECIFIED HEALTH STATUS: ICD-10-CM

## 2022-12-09 DIAGNOSIS — O03.9 COMPLETE OR UNSPECIFIED SPONTANEOUS ABORTION W/OUT COMPLICATION: ICD-10-CM

## 2022-12-09 DIAGNOSIS — Z82.3 FAMILY HISTORY OF STROKE: ICD-10-CM

## 2022-12-09 PROCEDURE — 99204 OFFICE O/P NEW MOD 45 MIN: CPT

## 2022-12-09 PROCEDURE — 99214 OFFICE O/P EST MOD 30 MIN: CPT

## 2022-12-09 RX ORDER — PREDNISONE 50 MG/1
50 TABLET ORAL
Qty: 3 | Refills: 0 | Status: COMPLETED | COMMUNITY
Start: 2022-09-18

## 2022-12-09 RX ORDER — METHYLPREDNISOLONE 4 MG/1
4 TABLET ORAL
Qty: 21 | Refills: 0 | Status: COMPLETED | COMMUNITY
Start: 2022-06-09

## 2022-12-09 RX ORDER — OSELTAMIVIR PHOSPHATE 75 MG/1
75 CAPSULE ORAL
Qty: 8 | Refills: 0 | Status: COMPLETED | COMMUNITY
Start: 2022-11-24

## 2022-12-09 RX ORDER — NITROFURANTOIN (MONOHYDRATE/MACROCRYSTALS) 25; 75 MG/1; MG/1
100 CAPSULE ORAL
Qty: 14 | Refills: 0 | Status: COMPLETED | COMMUNITY
Start: 2022-10-11

## 2022-12-09 RX ORDER — AMOXICILLIN AND CLAVULANATE POTASSIUM 500; 125 MG/1; MG/1
500-125 TABLET, FILM COATED ORAL
Qty: 10 | Refills: 0 | Status: COMPLETED | COMMUNITY
Start: 2022-11-24

## 2022-12-09 RX ORDER — AZITHROMYCIN 500 MG/1
500 TABLET, FILM COATED ORAL
Qty: 3 | Refills: 0 | Status: COMPLETED | COMMUNITY
Start: 2022-09-18

## 2022-12-09 NOTE — PHYSICAL EXAM
[Chaperone Present] : A chaperone was present in the examining room during all aspects of the physical examination [Appropriately responsive] : appropriately responsive [Alert] : alert [No Acute Distress] : no acute distress [No Lymphadenopathy] : no lymphadenopathy [Regular Rate Rhythm] : regular rate rhythm [No Murmurs] : no murmurs [Clear to Auscultation B/L] : clear to auscultation bilaterally [Soft] : soft [Non-tender] : non-tender [Non-distended] : non-distended [No HSM] : No HSM [No Lesions] : no lesions [No Mass] : no mass [Oriented x3] : oriented x3 [Examination Of The Breasts] : a normal appearance [No Masses] : no breast masses were palpable [Labia Majora] : normal [Labia Minora] : normal [Normal] : normal [Tenderness] : nontender [Enlarged ___ wks] : not enlarged [Anteversion] : anteverted [Uterine Adnexae] : normal normal...

## 2022-12-09 NOTE — HISTORY OF PRESENT ILLNESS
[Patient reported PAP Smear was normal] : Patient reported PAP Smear was normal [FreeTextEntry1] : 38YO  /22 s/p 16+3 sp AB then seen in ER with endometritis vs flu (WBC's=5K) and was observed in CDU, having received cytotec and passage of more tissue. Pt was D/C'd home the next day on Augmentin and Tamif She presents for initial checkup without major [email or web address] [PapSmeardate] : 10/22

## 2022-12-15 LAB — SURGICAL PATHOLOGY STUDY: SIGNIFICANT CHANGE UP

## 2022-12-23 RX ORDER — ACETAMINOPHEN 500 MG
3 TABLET ORAL
Qty: 0 | Refills: 0 | DISCHARGE

## 2022-12-23 RX ORDER — IBUPROFEN 200 MG
1 TABLET ORAL
Qty: 0 | Refills: 0 | DISCHARGE

## 2023-05-04 NOTE — DISCHARGE NOTE OB - SEVERE ABDOMINAL/VAGINAL AND/OR RECTAL PAIN
Daily note received from step therapy, placed in Dr. Limon's folder for review.  Darian Carlos EMT 05/04/2023 1:32PM     
Statement Selected

## 2025-01-10 NOTE — ED ADULT TRIAGE NOTE - NS ED TRIAGE AVPU SCALE
Interval progress note    Initial cell count from synovial fluid analysis shows 264 WBC which make suspicions for septic arthritis low.  Orthopedics will continue to follow additional synovial fluid labs.  Orthopedics will continue to follow peripherally during this patient's hospital stay.    Electronically signed by Freddy Bermudez DO on 1/9/2025 at 10:32 PM    Alert-The patient is alert, awake and responds to voice. The patient is oriented to time, place, and person. The triage nurse is able to obtain subjective information.
